# Patient Record
Sex: MALE | Race: WHITE | HISPANIC OR LATINO | Employment: FULL TIME | URBAN - METROPOLITAN AREA
[De-identification: names, ages, dates, MRNs, and addresses within clinical notes are randomized per-mention and may not be internally consistent; named-entity substitution may affect disease eponyms.]

---

## 2022-09-21 ENCOUNTER — TELEPHONE (OUTPATIENT)
Dept: SCHEDULING | Facility: IMAGING CENTER | Age: 62
End: 2022-09-21

## 2022-09-22 ENCOUNTER — OFFICE VISIT (OUTPATIENT)
Dept: MEDICAL GROUP | Facility: PHYSICIAN GROUP | Age: 62
End: 2022-09-22
Payer: COMMERCIAL

## 2022-09-22 ENCOUNTER — HOSPITAL ENCOUNTER (OUTPATIENT)
Dept: LAB | Facility: MEDICAL CENTER | Age: 62
End: 2022-09-22
Attending: NURSE PRACTITIONER
Payer: COMMERCIAL

## 2022-09-22 VITALS
HEIGHT: 72 IN | WEIGHT: 315 LBS | DIASTOLIC BLOOD PRESSURE: 76 MMHG | OXYGEN SATURATION: 97 % | RESPIRATION RATE: 18 BRPM | HEART RATE: 86 BPM | BODY MASS INDEX: 42.66 KG/M2 | SYSTOLIC BLOOD PRESSURE: 128 MMHG | TEMPERATURE: 97.1 F

## 2022-09-22 DIAGNOSIS — M25.562 CHRONIC PAIN OF BOTH KNEES: ICD-10-CM

## 2022-09-22 DIAGNOSIS — Z13.6 ENCOUNTER FOR LIPID SCREENING FOR CARDIOVASCULAR DISEASE: ICD-10-CM

## 2022-09-22 DIAGNOSIS — M25.561 CHRONIC PAIN OF BOTH KNEES: ICD-10-CM

## 2022-09-22 DIAGNOSIS — Z13.220 ENCOUNTER FOR LIPID SCREENING FOR CARDIOVASCULAR DISEASE: ICD-10-CM

## 2022-09-22 DIAGNOSIS — Z13.1 ENCOUNTER FOR SCREENING FOR DIABETES MELLITUS: ICD-10-CM

## 2022-09-22 DIAGNOSIS — E66.01 CLASS 3 SEVERE OBESITY DUE TO EXCESS CALORIES WITHOUT SERIOUS COMORBIDITY WITH BODY MASS INDEX (BMI) OF 50.0 TO 59.9 IN ADULT (HCC): ICD-10-CM

## 2022-09-22 DIAGNOSIS — Z13.0 SCREENING FOR DEFICIENCY ANEMIA: ICD-10-CM

## 2022-09-22 DIAGNOSIS — Z23 NEED FOR VACCINATION: ICD-10-CM

## 2022-09-22 DIAGNOSIS — G89.29 CHRONIC PAIN OF BOTH KNEES: ICD-10-CM

## 2022-09-22 PROBLEM — E66.813 CLASS 3 SEVERE OBESITY DUE TO EXCESS CALORIES WITHOUT SERIOUS COMORBIDITY WITH BODY MASS INDEX (BMI) OF 50.0 TO 59.9 IN ADULT (HCC): Status: ACTIVE | Noted: 2022-09-22

## 2022-09-22 LAB
ALBUMIN SERPL BCP-MCNC: 3.8 G/DL (ref 3.2–4.9)
ALBUMIN/GLOB SERPL: 1.2 G/DL
ALP SERPL-CCNC: 105 U/L (ref 30–99)
ALT SERPL-CCNC: 27 U/L (ref 2–50)
ANION GAP SERPL CALC-SCNC: 9 MMOL/L (ref 7–16)
AST SERPL-CCNC: 13 U/L (ref 12–45)
BILIRUB SERPL-MCNC: 0.5 MG/DL (ref 0.1–1.5)
BUN SERPL-MCNC: 19 MG/DL (ref 8–22)
CALCIUM SERPL-MCNC: 9 MG/DL (ref 8.5–10.5)
CHLORIDE SERPL-SCNC: 107 MMOL/L (ref 96–112)
CHOLEST SERPL-MCNC: 143 MG/DL (ref 100–199)
CO2 SERPL-SCNC: 25 MMOL/L (ref 20–33)
CREAT SERPL-MCNC: 0.87 MG/DL (ref 0.5–1.4)
ERYTHROCYTE [DISTWIDTH] IN BLOOD BY AUTOMATED COUNT: 46.1 FL (ref 35.9–50)
FASTING STATUS PATIENT QL REPORTED: NORMAL
GFR SERPLBLD CREATININE-BSD FMLA CKD-EPI: 97 ML/MIN/1.73 M 2
GLOBULIN SER CALC-MCNC: 3.1 G/DL (ref 1.9–3.5)
GLUCOSE SERPL-MCNC: 109 MG/DL (ref 65–99)
HCT VFR BLD AUTO: 51.9 % (ref 42–52)
HDLC SERPL-MCNC: 46 MG/DL
HGB BLD-MCNC: 17.3 G/DL (ref 14–18)
LDLC SERPL CALC-MCNC: 83 MG/DL
MCH RBC QN AUTO: 31.4 PG (ref 27–33)
MCHC RBC AUTO-ENTMCNC: 33.3 G/DL (ref 33.7–35.3)
MCV RBC AUTO: 94.2 FL (ref 81.4–97.8)
PLATELET # BLD AUTO: 297 K/UL (ref 164–446)
PMV BLD AUTO: 10 FL (ref 9–12.9)
POTASSIUM SERPL-SCNC: 4.8 MMOL/L (ref 3.6–5.5)
PROT SERPL-MCNC: 6.9 G/DL (ref 6–8.2)
RBC # BLD AUTO: 5.51 M/UL (ref 4.7–6.1)
SODIUM SERPL-SCNC: 141 MMOL/L (ref 135–145)
TRIGL SERPL-MCNC: 69 MG/DL (ref 0–149)
WBC # BLD AUTO: 8.4 K/UL (ref 4.8–10.8)

## 2022-09-22 PROCEDURE — 85027 COMPLETE CBC AUTOMATED: CPT

## 2022-09-22 PROCEDURE — 99204 OFFICE O/P NEW MOD 45 MIN: CPT | Mod: 25 | Performed by: NURSE PRACTITIONER

## 2022-09-22 PROCEDURE — 36415 COLL VENOUS BLD VENIPUNCTURE: CPT

## 2022-09-22 PROCEDURE — 80061 LIPID PANEL: CPT

## 2022-09-22 PROCEDURE — 90686 IIV4 VACC NO PRSV 0.5 ML IM: CPT | Performed by: NURSE PRACTITIONER

## 2022-09-22 PROCEDURE — 90471 IMMUNIZATION ADMIN: CPT | Performed by: NURSE PRACTITIONER

## 2022-09-22 PROCEDURE — 80053 COMPREHEN METABOLIC PANEL: CPT

## 2022-09-22 RX ORDER — ESCITALOPRAM OXALATE 20 MG/1
20 TABLET ORAL DAILY
COMMUNITY
Start: 2022-09-07 | End: 2022-09-22

## 2022-09-22 RX ORDER — ALBUTEROL SULFATE 90 UG/1
AEROSOL, METERED RESPIRATORY (INHALATION)
COMMUNITY
Start: 2022-07-27

## 2022-09-22 RX ORDER — MELOXICAM 7.5 MG/1
7.5 TABLET ORAL DAILY
Qty: 30 TABLET | Refills: 1 | Status: SHIPPED | OUTPATIENT
Start: 2022-09-22

## 2022-09-22 ASSESSMENT — PATIENT HEALTH QUESTIONNAIRE - PHQ9: CLINICAL INTERPRETATION OF PHQ2 SCORE: 0

## 2022-09-22 NOTE — ASSESSMENT & PLAN NOTE
Chronic and ongoing. He states that he knows he needs to work on his diet and exercise. He states that ever since he got COVID-19 he has had a difficult time getting back into feeling himself.

## 2022-09-22 NOTE — PROGRESS NOTES
Subjective  Chief Complaint  Establish care to manage his chronic conditions    History of Present Illness  Shyam Escamilla is a 62 y.o. male. This patient is here today to establish care.    Chronic pain of both knees  Chronic and worsening. He states that both of his knees have pain but over the past couple of weeks the pain has gotten worse. He states that the pain start on the inner part of his knee and spreads out to the outside of his knees. He states in the past it would hurt in the winter and he accounted it from him being active playing sports when he was younger. He states that now the pain is getting to the point that he is scared to go down stairs and he will take one stair at a time. He states that he has never got any imaging of his knees. He states that he does not take anything for the pain.    Class 3 severe obesity due to excess calories without serious comorbidity with body mass index (BMI) of 50.0 to 59.9 in adult (HCC)  Chronic and ongoing. He states that he knows he needs to work on his diet and exercise. He states that ever since he got COVID-19 he has had a difficult time getting back into feeling himself.    Past Medical History    Allergies: Patient has no known allergies.  History reviewed. No pertinent past medical history.  History reviewed. No pertinent surgical history.  Current Outpatient Medications Ordered in Epic   Medication Sig Dispense Refill    albuterol 108 (90 Base) MCG/ACT Aero Soln inhalation aerosol INHALE ONE TO TWO PUFFS BY MOUTH EVERY 4-6 HOURS AS NEEDED      meloxicam (MOBIC) 7.5 MG Tab Take 1 Tablet by mouth every day. 30 Tablet 1     No current Epic-ordered facility-administered medications on file.     Family History:    History reviewed. No pertinent family history.   Personal/Social History:    Social History     Tobacco Use    Smoking status: Never    Smokeless tobacco: Never   Vaping Use    Vaping Use: Never used   Substance Use Topics    Alcohol use: Not Currently     Drug use: Not Currently     Social History     Social History Narrative    Not on file      Review of Systems:     General: Negative for fever/chills and unexpected weight change.    Eyes:  Negative for vision changes, eye pain.   Respiratory:  Negative for cough and dyspnea.     Cardiovascular:  Negative for chest pain and palpitations.   Musculoskeletal: Positive for knee pain.   Skin:  Negative for rash.    Neurological:  Negative for numbness/tingling and headaches.     Objective  Physical Exam:   /76 (BP Location: Left arm, Patient Position: Sitting, BP Cuff Size: Large adult)   Pulse 86   Temp 36.2 °C (97.1 °F) (Temporal)   Resp 18   Ht 1.829 m (6')   Wt (!) 170 kg (374 lb 4 oz)   SpO2 97%  Body mass index is 50.76 kg/m².  General:  Alert and oriented.  Well appearing.  NAD.  Head:  Normocephalic.   Neck: Supple without JVD. No lymphadenopathy.  Pulmonary:  Normal effort.  Clear to ausculation without rales, ronchi, or wheezing.  Cardiovascular:  Regular rate and rhythm without murmur, rubs or gallop.  Radial pulses are intact and equal bilaterally.  Musculoskeletal:  No extremity cyanosis, clubbing, or edema.  Skin:  Warm and dry.  No obvious lesions.  Psych: Normal mood and affect. Alert and oriented x3. Judgment and insight is normal.      Assessment/Plan   1. Chronic pain of both knees  Chronic and worsening.  Discussed getting an x-ray of his knees, he is agreeable.  Discussed Meloxicam risks, benefits and side effects, he verbalized understanding.  Discussed depending on his x-ray results that I may refer him to Orthopedics, he is agreeable.  - DX-KNEE COMPLETE 4+ LEFT; Future  - DX-KNEE COMPLETE 4+ RIGHT; Future  - meloxicam (MOBIC) 7.5 MG Tab; Take 1 Tablet by mouth every day.  Dispense: 30 Tablet; Refill: 1    2. Class 3 severe obesity due to excess calories without serious comorbidity with body mass index (BMI) of 50.0 to 59.9 in adult (HCC)  Chronic and ongoing.  Educated on a healthy  diet and exercise.    3. Encounter for lipid screening for cardiovascular disease  Due for updated labs.  - Lipid Profile; Future    4. Encounter for screening for diabetes mellitus  Due for updated labs.  - Comp Metabolic Panel; Future    5. Screening for deficiency anemia  Due for updated labs.  - CBC WITHOUT DIFFERENTIAL; Future    6. Need for vaccination  - INFLUENZA VACCINE QUAD INJ (PF)      Health Maintenance: Records Requested    Return in about 1 month (around 10/22/2022) for Medication F/U.    I have placed the above orders and discussed them with an approved delegating provider.  The MA is performing the below orders under the direction of Dr. Osvaldo Mahmood MD/DO.     Please note that this dictation was created using voice recognition software. I have made every reasonable attempt to correct obvious errors, but I expect that there are errors of grammar and possibly content that I did not discover before finalizing the note.    CATARINO Garzon  [facility]

## 2022-09-22 NOTE — ASSESSMENT & PLAN NOTE
Chronic and worsening. He states that both of his knees have pain but over the past couple of weeks the pain has gotten worse. He states that the pain start on the inner part of his knee and spreads out to the outside of his knees. He states in the past it would hurt in the winter and he accounted it from him being active playing sports when he was younger. He states that now the pain is getting to the point that he is scared to go down stairs and he will take one stair at a time. He states that he has never got any imaging of his knees. He states that he does not take anything for the pain.

## 2022-09-26 ENCOUNTER — APPOINTMENT (OUTPATIENT)
Dept: RADIOLOGY | Facility: MEDICAL CENTER | Age: 62
End: 2022-09-26
Attending: NURSE PRACTITIONER
Payer: COMMERCIAL

## 2022-09-26 DIAGNOSIS — G89.29 CHRONIC PAIN OF BOTH KNEES: ICD-10-CM

## 2022-09-26 DIAGNOSIS — M25.561 CHRONIC PAIN OF BOTH KNEES: ICD-10-CM

## 2022-09-26 DIAGNOSIS — M25.562 CHRONIC PAIN OF BOTH KNEES: ICD-10-CM

## 2022-09-26 PROCEDURE — 73564 X-RAY EXAM KNEE 4 OR MORE: CPT | Mod: LT

## 2022-09-26 PROCEDURE — 73564 X-RAY EXAM KNEE 4 OR MORE: CPT | Mod: RT

## 2023-06-05 ENCOUNTER — HOSPITAL ENCOUNTER (OUTPATIENT)
Dept: RADIOLOGY | Facility: MEDICAL CENTER | Age: 63
End: 2023-06-05
Attending: PHYSICIAN ASSISTANT
Payer: COMMERCIAL

## 2023-06-05 DIAGNOSIS — C61 MALIGNANT NEOPLASM OF PROSTATE (HCC): ICD-10-CM

## 2023-06-05 PROCEDURE — 77080 DXA BONE DENSITY AXIAL: CPT

## 2024-04-18 ENCOUNTER — OFFICE VISIT (OUTPATIENT)
Dept: MEDICAL GROUP | Facility: IMAGING CENTER | Age: 64
End: 2024-04-18
Payer: COMMERCIAL

## 2024-04-18 VITALS
WEIGHT: 315 LBS | SYSTOLIC BLOOD PRESSURE: 126 MMHG | BODY MASS INDEX: 42.66 KG/M2 | TEMPERATURE: 97.8 F | OXYGEN SATURATION: 91 % | HEART RATE: 70 BPM | RESPIRATION RATE: 16 BRPM | HEIGHT: 72 IN | DIASTOLIC BLOOD PRESSURE: 70 MMHG

## 2024-04-18 DIAGNOSIS — Z11.59 NEED FOR HEPATITIS C SCREENING TEST: ICD-10-CM

## 2024-04-18 DIAGNOSIS — G47.30 SLEEP APNEA, UNSPECIFIED TYPE: ICD-10-CM

## 2024-04-18 DIAGNOSIS — Z85.46 HISTORY OF PROSTATE CANCER: ICD-10-CM

## 2024-04-18 DIAGNOSIS — Z11.4 SCREENING FOR HIV (HUMAN IMMUNODEFICIENCY VIRUS): ICD-10-CM

## 2024-04-18 DIAGNOSIS — G89.29 CHRONIC PAIN OF BOTH KNEES: ICD-10-CM

## 2024-04-18 DIAGNOSIS — J06.9 UPPER RESPIRATORY TRACT INFECTION, UNSPECIFIED TYPE: ICD-10-CM

## 2024-04-18 DIAGNOSIS — M25.561 CHRONIC PAIN OF BOTH KNEES: ICD-10-CM

## 2024-04-18 DIAGNOSIS — L91.8 ACROCHORDON: ICD-10-CM

## 2024-04-18 DIAGNOSIS — Z13.21 ENCOUNTER FOR VITAMIN DEFICIENCY SCREENING: ICD-10-CM

## 2024-04-18 DIAGNOSIS — E66.01 CLASS 3 SEVERE OBESITY DUE TO EXCESS CALORIES WITHOUT SERIOUS COMORBIDITY WITH BODY MASS INDEX (BMI) OF 50.0 TO 59.9 IN ADULT (HCC): ICD-10-CM

## 2024-04-18 DIAGNOSIS — R06.00 DYSPNEA, UNSPECIFIED TYPE: ICD-10-CM

## 2024-04-18 DIAGNOSIS — R09.02 HYPOXIA: ICD-10-CM

## 2024-04-18 DIAGNOSIS — M25.562 CHRONIC PAIN OF BOTH KNEES: ICD-10-CM

## 2024-04-18 LAB
FLUAV RNA SPEC QL NAA+PROBE: NEGATIVE
FLUBV RNA SPEC QL NAA+PROBE: NEGATIVE
RSV RNA SPEC QL NAA+PROBE: NEGATIVE
SARS-COV-2 RNA RESP QL NAA+PROBE: NEGATIVE

## 2024-04-18 PROCEDURE — 3078F DIAST BP <80 MM HG: CPT | Performed by: STUDENT IN AN ORGANIZED HEALTH CARE EDUCATION/TRAINING PROGRAM

## 2024-04-18 PROCEDURE — 0241U POCT CEPHEID COV-2, FLU A/B, RSV - PCR: CPT | Performed by: STUDENT IN AN ORGANIZED HEALTH CARE EDUCATION/TRAINING PROGRAM

## 2024-04-18 PROCEDURE — 99214 OFFICE O/P EST MOD 30 MIN: CPT | Performed by: STUDENT IN AN ORGANIZED HEALTH CARE EDUCATION/TRAINING PROGRAM

## 2024-04-18 PROCEDURE — 3074F SYST BP LT 130 MM HG: CPT | Performed by: STUDENT IN AN ORGANIZED HEALTH CARE EDUCATION/TRAINING PROGRAM

## 2024-04-18 RX ORDER — ALBUTEROL SULFATE 90 UG/1
1 AEROSOL, METERED RESPIRATORY (INHALATION) EVERY 4 HOURS PRN
Qty: 8.5 G | Refills: 3 | Status: SHIPPED | OUTPATIENT
Start: 2024-04-18

## 2024-04-18 ASSESSMENT — ANXIETY QUESTIONNAIRES
GAD7 TOTAL SCORE: 7
5. BEING SO RESTLESS THAT IT IS HARD TO SIT STILL: NOT AT ALL
4. TROUBLE RELAXING: NOT AT ALL
3. WORRYING TOO MUCH ABOUT DIFFERENT THINGS: NOT AT ALL
7. FEELING AFRAID AS IF SOMETHING AWFUL MIGHT HAPPEN: MORE THAN HALF THE DAYS
6. BECOMING EASILY ANNOYED OR IRRITABLE: MORE THAN HALF THE DAYS
2. NOT BEING ABLE TO STOP OR CONTROL WORRYING: MORE THAN HALF THE DAYS
1. FEELING NERVOUS, ANXIOUS, OR ON EDGE: SEVERAL DAYS

## 2024-04-18 ASSESSMENT — PATIENT HEALTH QUESTIONNAIRE - PHQ9: CLINICAL INTERPRETATION OF PHQ2 SCORE: 0

## 2024-04-18 ASSESSMENT — FIBROSIS 4 INDEX: FIB4 SCORE: 0.54

## 2024-04-18 NOTE — PATIENT INSTRUCTIONS
Thank you for choosing [facility]. It was a pleasure meeting you today.     Take care!  [facility]shmi [facility]ro[facility]

## 2024-04-18 NOTE — PROGRESS NOTES
Subjective:       CC:   Chief Complaint   Patient presents with    Congestion     Started 1 week ago    Shortness of Breath     After walking down stairs it was 89%    Establish Care    Cough    Medication Refill     Albuterol       HPI:   Shyam is a 64 y.o. male is new to me and is here today to establish care. Previous PCP was Gemma TORIBIO. Specialists:[facility]. Pt would like to discuss the following today:    URI:reports having nasal congestion and cough.  Symptoms started a week ago.  They have not worsened. Per wife, breathing has been worse. Has had pneumonia in the past. Cough has a yellow phlegm. Had wheezing at the beginning but has resolved. Denies body aches, fever, CP, diarrhea, vomiting, and loss of smell or taste. Has tried OTC Robitussin and it helped a little.       CARL: states he was diagnosed with CARL in 2010. A few months ago he was hospitalized with COVID-19 and was told his lungs were working at 80%. States he is out of breath frequently. He needs to sit down to catch his breath. His oxygen level drops 80s with activity.     History of prostate cancer: per pt he was diagnosed last year. S/P radiation. He's established with [facility]. Has upcoming appt next week.     Chronic pain in both knees: chronic. Per pt his pain is well controlled now. He takes Meloxicam 7.5mg daily.     Skin lesion: located next to L ear. Per wife it is growing and changing color. It is itchy and unconfortable. Denies family history of skin cancer.       Health Maintenance/Immunizations: Per pt he completed colonoscopy at [facility] or [facility]. Will obtain records. Per pt he is UTD with Zoster and hep B immunizations. Recommend Tdap.     ROS:   See HPI    Patient Active Problem List    Diagnosis Date Noted    Dyspnea 04/18/2024    History of prostate cancer 04/18/2024    Hypoxia 04/18/2024    Chronic pain of both knees 09/22/2022    Class 3 severe obesity due to excess calories without serious comorbidity with body  mass index (BMI) of 50.0 to 59.9 in adult ([facility]) 09/22/2022    Sleep apnea 02/11/2016       Past Medical History:   Diagnosis Date    Prostate cancer ([facility])        Current Outpatient Medications   Medication Sig Dispense Refill    albuterol 108 (90 Base) MCG/ACT Aero Soln inhalation aerosol Inhale 1 Puff every four hours as needed for Shortness of Breath. 8.5 g 3    meloxicam (MOBIC) 7.5 MG Tab Take 1 Tablet by mouth every day. 30 Tablet 1     No current facility-administered medications for this visit.       Allergies as of 04/18/2024    (No Known Allergies)       Social History     Socioeconomic History    Marital status:      Spouse name: Not on file    Number of children: Not on file    Years of education: Not on file    Highest education level: Not on file   Occupational History    Not on file   Tobacco Use    Smoking status: Never    Smokeless tobacco: Never   Vaping Use    Vaping Use: Never used   Substance and Sexual Activity    Alcohol use: Not Currently    Drug use: Not Currently    Sexual activity: Yes     Partners: Female     Birth control/protection: None   Other Topics Concern    Not on file   Social History Narrative    Not on file     Social Determinants of Health     Financial Resource Strain: Not on file   Food Insecurity: Not on file   Transportation Needs: Not on file   Physical Activity: Not on file   Stress: Not on file   Social Connections: Not on file   Intimate Partner Violence: Not on file   Housing Stability: Not on file       Family History   Problem Relation Age of Onset    Cancer Mother         Breast    Glaucoma Father     Cancer Brother         Prostate       History reviewed. No pertinent surgical history.        Objective:     /70 (BP Location: Right arm, Patient Position: Sitting, BP Cuff Size: Adult)   Pulse 70   Temp 36.6 °C (97.8 °F) (Temporal)   Resp 16   Ht 1.829 m (6')   Wt (!) 177 kg (390 lb)   SpO2 94%   BMI 52.89 kg/m²     Physical Exam  Vitals reviewed.    Constitutional:       General: He is not in acute distress.     Appearance: Normal appearance. He is not ill-appearing or toxic-appearing.   HENT:      Head: Normocephalic and atraumatic.      Right Ear: Tympanic membrane, ear canal and external ear normal.      Left Ear: Tympanic membrane, ear canal and external ear normal.      Nose: Congestion and rhinorrhea present.      Mouth/Throat:      Mouth: Mucous membranes are moist.      Pharynx: Oropharynx is clear. No oropharyngeal exudate or posterior oropharyngeal erythema.      Tonsils: No tonsillar exudate.   Eyes:      General: No scleral icterus.        Right eye: No discharge.         Left eye: No discharge.      Extraocular Movements: Extraocular movements intact.      Conjunctiva/sclera: Conjunctivae normal.      Pupils: Pupils are equal, round, and reactive to light.   Neck:      Thyroid: No thyroid mass or thyromegaly.   Cardiovascular:      Rate and Rhythm: Normal rate and regular rhythm.      Pulses: Normal pulses.      Heart sounds: Normal heart sounds. No murmur heard.  Pulmonary:      Effort: Pulmonary effort is normal. No respiratory distress.      Breath sounds: Normal breath sounds. No wheezing or rhonchi.      Comments: Diminished lung sounds  Abdominal:      General: Bowel sounds are normal. There is no distension.      Palpations: Abdomen is soft. There is no mass.      Tenderness: There is no abdominal tenderness.   Musculoskeletal:         General: Normal range of motion.      Cervical back: Normal range of motion and neck supple. No tenderness.   Lymphadenopathy:      Cervical: No cervical adenopathy.   Skin:     General: Skin is warm and dry.      Coloration: Skin is not jaundiced.      Comments: Skin tag present over L zygomatic arch   Neurological:      General: No focal deficit present.      Mental Status: He is alert and oriented to person, place, and time.      Gait: Gait normal.   Psychiatric:         Mood and Affect: Mood normal.          Behavior: Behavior normal.         Thought Content: Thought content normal.         Judgment: Judgment normal.          Assessment and Plan:     1. Dyspnea, unspecified type  2. Hypoxia  Acute.  Patient has been experiencing hypoxia and shortness of breath with activity.  O2 sats did drop to high 80s during walking test.  O2 sats were above 90% with oxygen 2 L via nasal canula.  Faxed order for oxygen 2L via nasal canula.  Sent prescription for albuterol inhaler to use as needed.  Referred to pulmonology.  Chest x-ray and PFTs ordered to evaluate his lung function.  Will follow-up after completing tests.  Strict ED precautions given.  - albuterol 108 (90 Base) MCG/ACT Aero Soln inhalation aerosol; Inhale 1 Puff every four hours as needed for Shortness of Breath.  Dispense: 8.5 g; Refill: 3  - Referral to Pulmonary and Sleep Medicine  - PULMONARY FUNCTION TESTS -Test requested: Complete Pulmonary Function Test; Future  - DX-CHEST-2 VIEWS; Future        3. Sleep apnea, unspecified type  Chronic and stable.  Patient uses CPAP.  Referred to sleep medicine for new sleep test.  Chest x-ray and PFTs ordered for evaluation.  - Referral to Pulmonary and Sleep Medicine  - PULMONARY FUNCTION TESTS -Test requested: Complete Pulmonary Function Test; Future  - DX-CHEST-2 VIEWS; Future    4. Class 3 severe obesity due to excess calories without serious comorbidity with body mass index (BMI) of 50.0 to 59.9 in adult (HCC)  Chronic.  Per patient he cannot exercise due to his breathing.  Recommend healthy diet.  He is interested in nutrition counseling, referral placed.  Screening labs as listed below ordered.  Discussed lifestyle modifications to help lose weight and help prevent disease such as diabetes and heart disease.  Improve your eating habits. Eat a variety of foods from each food group: include whole grains, vegetables, fruits, low fat or fat free dairy, and protein foods, mostly plant based. Limit foods high in fat,  sugar, calories, and sodium. Eat slowly, and don't do anything else, such as watch TV, while you are eating. Pay attention to portion sizes. Put your food on a smaller plate, follow MyPlate guidelines:vegetables make up the largest section, followed by grains. Together, fruits and vegetables fill half the plate, while proteins and grains fill the other half.  Regular activity can help you feel better, have more energy, and burn more calories. If you haven't been active, start slowly. Start with at least 30 minutes of moderate activity on most days of the week; then gradually increase the amount of activity. Try for 60 or 90 minutes a day, at least 5 days a week.   - HEMOGLOBIN A1C; Future  - TSH WITH REFLEX TO FT4; Future  - Comp Metabolic Panel; Future  - CBC WITH DIFFERENTIAL; Future  - Lipid Profile; Future  - Referral to Nutrition Services    5. Chronic pain of both knees  Chronic and well-controlled.  Will continue with meloxicam 7.5 mg as needed.    6. History of prostate cancer-s/p radiation in 2023  Established with [facility].  Will follow-up as planned.    7. Need for hepatitis C screening test  - HEP C VIRUS ANTIBODY; Future    8. Screening for HIV (human immunodeficiency virus)  - HIV AG/AB COMBO ASSAY SCREENING; Future    9. Encounter for vitamin deficiency screening  - VITAMIN D 25-HYDROXY    10. Acrochordon  Chronic.  Patient would like it removed.  Referred to dermatology.  - Referral to Dermatology    11. Upper respiratory tract infection, unspecified type  Acute.  Suspect viral etiology.  Recommend supportive care.  Point-of-care COVID-19, flu, and RSV negative.  Advised patient to return to clinic if symptoms persist beyond two weeks and to go to emergency room if symptoms worsen.  - POCT Cepheid CoV-2, Flu A/B, RSV - PCR    Latest Reference Range & Units 04/18/24 09:04   Influenza virus A RNA Negative, Invalid  Negative   Influenza virus B, PCR Negative, Invalid  Negative   RSV, PCR Negative,  Invalid  Negative   SARS-CoV-2 by PCR Negative, Invalid  Negative         Diagnosis and treatment plan explained to pt. Pt agreed with treatment plan and verbalized understanding.       Return in about 6 weeks (around 5/30/2024) for test results.     Please note that this dictation was created using voice recognition software. I have made every reasonable attempt to correct obvious errors, but I expect that there are errors of grammar and possibly content that I did not discover before finalizing the note.    Lakshmi Infante PA-C  [facility]

## 2024-04-24 DIAGNOSIS — R06.00 DYSPNEA, UNSPECIFIED TYPE: ICD-10-CM

## 2024-04-24 DIAGNOSIS — R09.02 HYPOXIA: ICD-10-CM

## 2024-05-16 ENCOUNTER — APPOINTMENT (RX ONLY)
Dept: URBAN - METROPOLITAN AREA CLINIC 4 | Facility: CLINIC | Age: 64
Setting detail: DERMATOLOGY
End: 2024-05-16

## 2024-05-16 DIAGNOSIS — D22 MELANOCYTIC NEVI: ICD-10-CM

## 2024-05-16 DIAGNOSIS — Z71.89 OTHER SPECIFIED COUNSELING: ICD-10-CM

## 2024-05-16 PROBLEM — D48.5 NEOPLASM OF UNCERTAIN BEHAVIOR OF SKIN: Status: ACTIVE | Noted: 2024-05-16

## 2024-05-16 PROCEDURE — ? PHOTO-DOCUMENTATION

## 2024-05-16 PROCEDURE — ? COUNSELING

## 2024-05-16 PROCEDURE — ? DIAGNOSIS COMMENT

## 2024-05-16 PROCEDURE — ? DEFER

## 2024-05-16 PROCEDURE — 99203 OFFICE O/P NEW LOW 30 MIN: CPT

## 2024-05-16 ASSESSMENT — LOCATION ZONE DERM
LOCATION ZONE: SCALP
LOCATION ZONE: FACE

## 2024-05-16 ASSESSMENT — LOCATION SIMPLE DESCRIPTION DERM
LOCATION SIMPLE: LEFT FOREHEAD
LOCATION SIMPLE: SCALP

## 2024-05-16 ASSESSMENT — LOCATION DETAILED DESCRIPTION DERM
LOCATION DETAILED: LEFT SUPERIOR FOREHEAD
LOCATION DETAILED: RIGHT CENTRAL FRONTAL SCALP

## 2024-05-16 NOTE — PROCEDURE: DIAGNOSIS COMMENT
Detail Level: Simple
Comment: Patient has two lesions I recommend biopsy when he returns from his trip in two weeks.  He understands my concern and risk.
Render Risk Assessment In Note?: no

## 2024-05-28 ENCOUNTER — TELEPHONE (OUTPATIENT)
Dept: HEALTH INFORMATION MANAGEMENT | Facility: OTHER | Age: 64
End: 2024-05-28
Payer: COMMERCIAL

## 2024-05-29 ENCOUNTER — OFFICE VISIT (OUTPATIENT)
Dept: MEDICAL GROUP | Facility: IMAGING CENTER | Age: 64
End: 2024-05-29
Payer: COMMERCIAL

## 2024-05-29 VITALS
HEIGHT: 72 IN | TEMPERATURE: 97.1 F | WEIGHT: 315 LBS | SYSTOLIC BLOOD PRESSURE: 126 MMHG | DIASTOLIC BLOOD PRESSURE: 72 MMHG | OXYGEN SATURATION: 95 % | BODY MASS INDEX: 42.66 KG/M2 | RESPIRATION RATE: 16 BRPM | HEART RATE: 74 BPM

## 2024-05-29 DIAGNOSIS — Z23 ENCOUNTER FOR ADMINISTRATION OF VACCINE: ICD-10-CM

## 2024-05-29 DIAGNOSIS — M54.50 ACUTE RIGHT-SIDED LOW BACK PAIN WITHOUT SCIATICA: ICD-10-CM

## 2024-05-29 PROBLEM — R09.02 HYPOXIA: Status: RESOLVED | Noted: 2024-04-18 | Resolved: 2024-05-29

## 2024-05-29 PROBLEM — R06.00 DYSPNEA: Status: RESOLVED | Noted: 2024-04-18 | Resolved: 2024-05-29

## 2024-05-29 LAB
APPEARANCE UR: CLEAR
BILIRUB UR STRIP-MCNC: NEGATIVE MG/DL
COLOR UR AUTO: YELLOW
GLUCOSE UR STRIP.AUTO-MCNC: NEGATIVE MG/DL
KETONES UR STRIP.AUTO-MCNC: NEGATIVE MG/DL
LEUKOCYTE ESTERASE UR QL STRIP.AUTO: NEGATIVE
NITRITE UR QL STRIP.AUTO: NEGATIVE
PH UR STRIP.AUTO: 5.5 [PH] (ref 5–8)
PROT UR QL STRIP: NEGATIVE MG/DL
RBC UR QL AUTO: NEGATIVE
SP GR UR STRIP.AUTO: <=1.005
UROBILINOGEN UR STRIP-MCNC: 0.2 MG/DL

## 2024-05-29 RX ORDER — MELOXICAM 7.5 MG/1
7.5 TABLET ORAL
Qty: 30 TABLET | Refills: 0 | Status: SHIPPED | OUTPATIENT
Start: 2024-05-29

## 2024-05-29 ASSESSMENT — FIBROSIS 4 INDEX: FIB4 SCORE: 0.54

## 2024-05-29 NOTE — PROGRESS NOTES
Subjective:     CC:   Chief Complaint   Patient presents with    Back Pain     Lower right back, feels like its stabbing, does not radiate to flanks, upper back or hips. Hurts when bending over and standing too long       HPI:     Verbal consent was acquired by the patient to use [employer] ambient listening note generation during this visit Yes      reno Freeman, 64 y.o., male,  presents today to discuss:     History of Present Illness    Acute back pain:  The patient reports experiencing right-sided lower back pain, which initiated approximately 2 weeks ago. Despite the application of ice, the pain persists. The pain intensifies during physical activities such as bending, moving, standing, and ambulation, often leading to a sensation of instability. The pain does not radiate to the legs. The patient denies any history of back injuries or falls, and reports a pain level of 10 on a scale of 1 to 10. The pain is described as a sharp, stabbing sensation. The patient denies any urinary symptoms such as hematuria, urinary frequency, or urgency. However, the patient reports increased urination, which he attributes to his increase of water intake, 120 ounces of water daily. The patient has lost approximately 30 pounds, attributing this to cutting his sugar intake and doing lifestyle modifications. The patient denies any fever, chills, nausea, or vomiting. The patient has run out of meloxicam. Does not take anything for pain.    Since he has been losing weight, he has not experienced respiratory problems.  O2 sats are well-maintained above 90%.  He has not completed labs but will schedule appointment with [facility].       ROS:  See HPI    Medications, allergies, past medical history, family history, surgical history, and social history documented in chart and reviewed by me.       Objective:   Exam:  /72 (BP Location: Left arm, Patient Position: Sitting, BP Cuff Size: Adult)   Pulse 74   Temp 36.2  °C (97.1 °F) (Temporal)   Resp 16   Ht 1.829 m (6')   Wt (!) 165 kg (363 lb 9.6 oz)   SpO2 95%   BMI 49.31 kg/m²      Physical Exam  Vitals reviewed.   Constitutional:       General: He is not in acute distress.     Appearance: Normal appearance. He is not ill-appearing.   HENT:      Head: Normocephalic and atraumatic.   Eyes:      General: No scleral icterus.  Cardiovascular:      Rate and Rhythm: Normal rate and regular rhythm.      Pulses: Normal pulses.      Heart sounds: Normal heart sounds. No murmur heard.  Pulmonary:      Effort: Pulmonary effort is normal. No respiratory distress.      Breath sounds: Normal breath sounds. No wheezing.   Abdominal:      General: Abdomen is flat. Bowel sounds are normal. There is no distension.      Palpations: Abdomen is soft. There is no mass.      Tenderness: There is no abdominal tenderness.   Musculoskeletal:         General: No swelling, tenderness or deformity.      Cervical back: Neck supple. No swelling, edema or deformity.      Thoracic back: No swelling, edema, deformity or signs of trauma.      Lumbar back: Bony tenderness present. No swelling, edema, deformity or spasms. Normal range of motion.      Comments: Right flank pain tender to palpation   Skin:     General: Skin is warm and dry.      Coloration: Skin is not jaundiced.      Findings: No bruising.   Neurological:      General: No focal deficit present.      Mental Status: He is alert.      Motor: No weakness.      Gait: Gait normal.   Psychiatric:         Mood and Affect: Mood normal.         Behavior: Behavior normal.         Thought Content: Thought content normal.         Judgment: Judgment normal.              Assessment & Plan:       Assessment & Plan  1. Acute right-sided low back pain without sciatica  Acute. Unknown etiology.  Differential diagnosis include but not limited to muscle strain, cystitis, nephrolithiasis, and pyelonephritis.  Patient does not have symptoms indicating pyelonephritis  or cystitis.  Point-of-care UA is normal.  Stat CT of abdomen and pelvis ordered to rule out nephrolithiasis.  Refill for meloxicam 7.5 mg to take for pain relief.  Advised patient to take medication with food.  Will follow-up with patient after completing CT scan.  Strict ED precautions given.  - POCT Urinalysis  - CT-ABDOMEN-PELVIS W/O; Future  - meloxicam (MOBIC) 7.5 MG Tab; Take 1 Tablet by mouth 1 time a day as needed for Mild Pain (as needed for pain).  Dispense: 30 Tablet; Refill: 0   Lab Results   Component Value Date/Time    POCCOLOR Yellow 05/29/2024 09:29 AM    POCAPPEAR Clear 05/29/2024 09:29 AM    POCLEUKEST Negative 05/29/2024 09:29 AM    POCNITRITE Negative 05/29/2024 09:29 AM    POCUROBILIGE 0.2 05/29/2024 09:29 AM    POCPROTEIN Negative 05/29/2024 09:29 AM    POCURPH 5.5 05/29/2024 09:29 AM    POCBLOOD Negative 05/29/2024 09:29 AM    POCSPGRV <=1.005 05/29/2024 09:29 AM    POCKETONES Negative 05/29/2024 09:29 AM    POCBILIRUBIN Negative 05/29/2024 09:29 AM    POCGLUCUA Negative 05/29/2024 09:29 AM         2.  Encounter for administration of vaccine  Patient received Tdap today.  Tolerated well.             Diagnosis and treatment plan explained to pt. Counseled pt on new medication(s) and potential side effects. Pt agreed with treatment plan and verbalized understanding.     Return for schedule follow up after completing tests.     Please note that this dictation was created using voice recognition software. I have made every reasonable attempt to correct obvious errors, but I expect that there are errors of grammar and possibly content that I did not discover before finalizing the note.    Lakshmi Infante PA-C  [facility]

## 2024-06-03 ENCOUNTER — APPOINTMENT (RX ONLY)
Dept: URBAN - METROPOLITAN AREA CLINIC 4 | Facility: CLINIC | Age: 64
Setting detail: DERMATOLOGY
End: 2024-06-03

## 2024-06-03 DIAGNOSIS — Z71.89 OTHER SPECIFIED COUNSELING: ICD-10-CM

## 2024-06-03 DIAGNOSIS — D22 MELANOCYTIC NEVI: ICD-10-CM

## 2024-06-03 PROBLEM — D48.5 NEOPLASM OF UNCERTAIN BEHAVIOR OF SKIN: Status: ACTIVE | Noted: 2024-06-03

## 2024-06-03 PROCEDURE — 11102 TANGNTL BX SKIN SINGLE LES: CPT

## 2024-06-03 PROCEDURE — 11103 TANGNTL BX SKIN EA SEP/ADDL: CPT

## 2024-06-03 PROCEDURE — 99212 OFFICE O/P EST SF 10 MIN: CPT | Mod: 25

## 2024-06-03 PROCEDURE — ? BIOPSY BY SHAVE METHOD

## 2024-06-03 PROCEDURE — ? COUNSELING

## 2024-06-03 PROCEDURE — ? DIAGNOSIS COMMENT

## 2024-06-03 ASSESSMENT — LOCATION ZONE DERM
LOCATION ZONE: FACE
LOCATION ZONE: SCALP

## 2024-06-03 ASSESSMENT — LOCATION SIMPLE DESCRIPTION DERM
LOCATION SIMPLE: LEFT FOREHEAD
LOCATION SIMPLE: SCALP

## 2024-06-03 ASSESSMENT — LOCATION DETAILED DESCRIPTION DERM
LOCATION DETAILED: LEFT SUPERIOR FOREHEAD
LOCATION DETAILED: RIGHT CENTRAL FRONTAL SCALP

## 2024-10-22 ENCOUNTER — PATIENT MESSAGE (OUTPATIENT)
Dept: URGENT CARE | Facility: PHYSICIAN GROUP | Age: 64
End: 2024-10-22

## 2024-10-22 ENCOUNTER — OFFICE VISIT (OUTPATIENT)
Dept: URGENT CARE | Facility: PHYSICIAN GROUP | Age: 64
End: 2024-10-22
Payer: COMMERCIAL

## 2024-10-22 VITALS
DIASTOLIC BLOOD PRESSURE: 70 MMHG | HEART RATE: 93 BPM | HEIGHT: 72 IN | BODY MASS INDEX: 42.66 KG/M2 | SYSTOLIC BLOOD PRESSURE: 108 MMHG | WEIGHT: 315 LBS | RESPIRATION RATE: 16 BRPM | TEMPERATURE: 98.6 F | OXYGEN SATURATION: 92 %

## 2024-10-22 DIAGNOSIS — U07.1 COVID-19: ICD-10-CM

## 2024-10-22 DIAGNOSIS — R05.1 ACUTE COUGH: ICD-10-CM

## 2024-10-22 DIAGNOSIS — J02.9 SORE THROAT: ICD-10-CM

## 2024-10-22 LAB
FLUAV RNA SPEC QL NAA+PROBE: NEGATIVE
FLUBV RNA SPEC QL NAA+PROBE: NEGATIVE
RSV RNA SPEC QL NAA+PROBE: NEGATIVE
S PYO DNA SPEC NAA+PROBE: NOT DETECTED
SARS-COV-2 RNA RESP QL NAA+PROBE: POSITIVE

## 2024-10-22 PROCEDURE — 3078F DIAST BP <80 MM HG: CPT | Performed by: PHYSICIAN ASSISTANT

## 2024-10-22 PROCEDURE — 99214 OFFICE O/P EST MOD 30 MIN: CPT | Performed by: PHYSICIAN ASSISTANT

## 2024-10-22 PROCEDURE — 3074F SYST BP LT 130 MM HG: CPT | Performed by: PHYSICIAN ASSISTANT

## 2024-10-22 PROCEDURE — 87651 STREP A DNA AMP PROBE: CPT | Performed by: PHYSICIAN ASSISTANT

## 2024-10-22 PROCEDURE — 0241U POCT CEPHEID COV-2, FLU A/B, RSV - PCR: CPT | Performed by: PHYSICIAN ASSISTANT

## 2024-10-22 RX ORDER — BENZONATATE 100 MG/1
100 CAPSULE ORAL 3 TIMES DAILY PRN
Qty: 30 CAPSULE | Refills: 0 | Status: SHIPPED | OUTPATIENT
Start: 2024-10-22

## 2024-10-22 RX ORDER — ALBUTEROL SULFATE 90 UG/1
2 INHALANT RESPIRATORY (INHALATION) EVERY 6 HOURS PRN
Qty: 8.5 G | Refills: 0 | Status: SHIPPED | OUTPATIENT
Start: 2024-10-22

## 2024-10-22 ASSESSMENT — ENCOUNTER SYMPTOMS
CHILLS: 0
SHORTNESS OF BREATH: 0
SORE THROAT: 1
WHEEZING: 0
COUGH: 1
SPUTUM PRODUCTION: 1
FEVER: 0
PALPITATIONS: 0
HEMOPTYSIS: 0

## 2024-10-31 ENCOUNTER — OFFICE VISIT (OUTPATIENT)
Dept: URGENT CARE | Facility: PHYSICIAN GROUP | Age: 64
End: 2024-10-31
Payer: COMMERCIAL

## 2024-10-31 VITALS
RESPIRATION RATE: 18 BRPM | WEIGHT: 315 LBS | DIASTOLIC BLOOD PRESSURE: 64 MMHG | TEMPERATURE: 97.6 F | OXYGEN SATURATION: 95 % | BODY MASS INDEX: 50.18 KG/M2 | HEART RATE: 84 BPM | SYSTOLIC BLOOD PRESSURE: 120 MMHG

## 2024-10-31 DIAGNOSIS — U07.1 COVID-19: ICD-10-CM

## 2024-10-31 ASSESSMENT — ENCOUNTER SYMPTOMS
FEVER: 0
SINUS PAIN: 1

## 2025-06-04 ENCOUNTER — APPOINTMENT (OUTPATIENT)
Dept: MEDICAL GROUP | Facility: IMAGING CENTER | Age: 65
End: 2025-06-04
Payer: COMMERCIAL

## 2025-06-04 VITALS
BODY MASS INDEX: 42.66 KG/M2 | RESPIRATION RATE: 16 BRPM | HEART RATE: 66 BPM | OXYGEN SATURATION: 87 % | DIASTOLIC BLOOD PRESSURE: 62 MMHG | WEIGHT: 315 LBS | SYSTOLIC BLOOD PRESSURE: 110 MMHG | TEMPERATURE: 97.7 F | HEIGHT: 72 IN

## 2025-06-04 DIAGNOSIS — Z11.59 NEED FOR HEPATITIS C SCREENING TEST: ICD-10-CM

## 2025-06-04 DIAGNOSIS — Z85.46 HISTORY OF PROSTATE CANCER: ICD-10-CM

## 2025-06-04 DIAGNOSIS — E66.813 CLASS 3 SEVERE OBESITY WITH SERIOUS COMORBIDITY AND BODY MASS INDEX (BMI) OF 50.0 TO 59.9 IN ADULT, UNSPECIFIED OBESITY TYPE: ICD-10-CM

## 2025-06-04 DIAGNOSIS — R09.02 HYPOXIA: ICD-10-CM

## 2025-06-04 DIAGNOSIS — G47.33 OBSTRUCTIVE SLEEP APNEA SYNDROME: ICD-10-CM

## 2025-06-04 DIAGNOSIS — K45.8 RECURRENT ABDOMINAL HERNIA WITHOUT OBSTRUCTION OR GANGRENE, UNSPECIFIED HERNIA TYPE: ICD-10-CM

## 2025-06-04 DIAGNOSIS — Z00.01 ENCOUNTER FOR GENERAL ADULT MEDICAL EXAMINATION WITH ABNORMAL FINDINGS: Primary | ICD-10-CM

## 2025-06-04 DIAGNOSIS — Z11.4 SCREENING FOR HIV (HUMAN IMMUNODEFICIENCY VIRUS): ICD-10-CM

## 2025-06-04 DIAGNOSIS — R06.09 EXERTIONAL DYSPNEA: ICD-10-CM

## 2025-06-04 DIAGNOSIS — Z13.21 ENCOUNTER FOR VITAMIN DEFICIENCY SCREENING: ICD-10-CM

## 2025-06-04 DIAGNOSIS — Z12.11 SCREENING FOR COLON CANCER: ICD-10-CM

## 2025-06-04 PROBLEM — E66.9 OBESITY, UNSPECIFIED: Status: ACTIVE | Noted: 2022-09-22

## 2025-06-04 PROCEDURE — 99397 PER PM REEVAL EST PAT 65+ YR: CPT | Mod: 25 | Performed by: STUDENT IN AN ORGANIZED HEALTH CARE EDUCATION/TRAINING PROGRAM

## 2025-06-04 PROCEDURE — 99214 OFFICE O/P EST MOD 30 MIN: CPT | Performed by: STUDENT IN AN ORGANIZED HEALTH CARE EDUCATION/TRAINING PROGRAM

## 2025-06-04 PROCEDURE — 3074F SYST BP LT 130 MM HG: CPT | Performed by: STUDENT IN AN ORGANIZED HEALTH CARE EDUCATION/TRAINING PROGRAM

## 2025-06-04 PROCEDURE — 3078F DIAST BP <80 MM HG: CPT | Performed by: STUDENT IN AN ORGANIZED HEALTH CARE EDUCATION/TRAINING PROGRAM

## 2025-06-04 ASSESSMENT — PATIENT HEALTH QUESTIONNAIRE - PHQ9: CLINICAL INTERPRETATION OF PHQ2 SCORE: 0

## 2025-06-04 NOTE — PROGRESS NOTES
Subjective:     CC:   Chief Complaint   Patient presents with    Annual Exam    Weight Loss       HPI:   Shyam Escamilla is a pleasant 65 y.o. male who presents for an annual exam.     Verbal consent was acquired by the patient to use [employer] ambient listening note generation during this visit Yes         History of Present Illness  The patient presents for an annual wellness visit.    History of prostate cancer  He has a history of prostate cancer, for which he underwent radiation therapy. His last consultation with the urologist was less than a year ago, and he has not undergone any prostate surgeries. He is agreeable to having a PSA test done.     CARL  He has been using a CPAP machine for approximately 5 years for sleep apnea but believes it requires replacement due to unusual noises. He has not followed up with the specialist. He reports snoring and occasional fatigue but does not experience daytime tiredness. He has been observed to cease breathing during sleep. He does not have high blood pressure and is not on any antihypertensive medications. His BMI is around 35, and his neck circumference is 22.5 inches.    STOPBANG - Sleep Apnea Screening      Flowsheet Row Most Recent Value   S - Have you been told that you SNORE? Yes   T - Are you often TIRED during the day? No   O - Do you know if you stop breathing or has anyone witnessed you stop breathing while you were asleep? (OBSTRUCTION) Yes   P - Do you have high blood PRESSURE or on medication to control high blood pressure? No   B - Is your Body Mass Index greater than 35? (BMI) Yes   A - Are you 50 years old or older? (AGE) Yes   N - Are you a male with a NECK circumference greater than 17 inches, or a female with a neck circumference greater than 16 inches? Yes   G - Are you male? (GENDER) Yes   STOPBANG Total Score 6   CARL Risk High Risk          SOB  He experiences shortness of breath during physical activity but not at rest or during sleep. He  does not use supplemental oxygen with his CPAP machine. He has not completed the previously ordered chest x-ray and pulmonary function tests.    Hernia  He has noticed a bulge near the site of his previous umbilical hernia repair, which was performed 10 to 15 years ago in [place].    Weight  He has reduced his food intake and engages in daily 1-mile walks but has not observed any weight loss. He aims to achieve 7000 steps per day. He is considering the use of Ozempic for weight management.         Health Maintenance   Advanced directive:  n/a  PT/vit D for falls prevention: takes Vitamin D supplements  Cholesterol Screening: Lipid ordered  Diabetes Screening: A1C and CMP ordered  AAA Screening: n/a  Aspirin Use: no  Diet: has been eating less  Exercise: yes  Dental Care: yes  Eye exam:yes  Substance Abuse: denies  Safe in relationship:yes  Seat belts, bike helmet, gun safety discussed.  Sun protection discussed.     Cancer screening  Colorectal Cancer Screening: referral placed  Lung Cancer Screening: n/a  Prostate Cancer Screening/PSA: has history of prostate cancer. PSA ordered for surveillance       Infectious disease screening/Immunizations  --STI Screening: declined CT/NG screening  --Practices safe sex: yes  --HIV Screening:  HIV test ordered  --Hepatitis C Screening: Hep C test ordered today  --Immunizations:    Influenza: Out of season   HPV:  N/A   Tetanus: UTD   Shingles: Recommend at the pharmacy   Pneumococcal : declined by pt   Other immunizations:     He  has a past medical history of Prostate cancer (HCC).  He  has no past surgical history on file.  Family History   Problem Relation Age of Onset    Cancer Mother         Breast    Glaucoma Father     Cancer Brother         Prostate     Social History[1]    Patient Active Problem List    Diagnosis Date Noted    Exertional dyspnea 04/18/2024    History of prostate cancer 04/18/2024    Chronic pain of both knees 09/22/2022    Obesity, unspecified  09/22/2022    Sleep apnea 02/11/2016       Current Medications[2] (including changes today)  Allergies: Patient has no known allergies.    Review of Systems   Constitutional: Negative for fever, chills and malaise/fatigue.   HENT: Negative for congestion.    Eyes: Negative for pain.   Respiratory: Negative for cough and shortness of breath.    Cardiovascular: Negative for chest pain. Negative for leg swelling.   Gastrointestinal: Negative for nausea, vomiting, abdominal pain and diarrhea.   Genitourinary: Negative for dysuria and hematuria.   Skin: Negative for rash.   Neurological: Negative for dizziness, focal weakness and headaches.   Endo/Heme/Allergies: Does not bruise/bleed easily.   Psychiatric/Behavioral: Negative for depression.  The patient is not nervous/anxious.      Objective:     /62 (BP Location: Left arm, Patient Position: Sitting, BP Cuff Size: Large adult)   Pulse 66   Temp 36.5 °C (97.7 °F) (Temporal)   Resp 16   Ht 1.829 m (6')   Wt (!) 175 kg (385 lb)   SpO2 (!) 87% Comment: with ambulation. 2 minutes of walking  BMI 52.22 kg/m²   Body mass index is 52.22 kg/m².  Wt Readings from Last 4 Encounters:   06/04/25 (!) 175 kg (385 lb)   10/31/24 (!) 168 kg (370 lb)   10/22/24 (!) 167 kg (367 lb 12.8 oz)   05/29/24 (!) 165 kg (363 lb 9.6 oz)       Physical Exam:  Constitutional: Well-developed and well-nourished. Not diaphoretic. No distress.   Skin: Skin is warm and dry. No rash noted.  Head: Atraumatic without lesions.  Eyes: Conjunctivae and extraocular motions are normal. Pupils are equal, round, and reactive to light. No scleral icterus.   Ears:  External ears unremarkable. Tympanic membranes clear and intact.  Nose: Nares patent. Septum midline. Turbinates without erythema nor edema. No discharge.   Mouth/Throat: Dentition is normal. Tongue normal. Oropharynx is clear and moist. Posterior pharynx without erythema or exudates.  Neck: Supple, trachea midline. Normal range of motion. No  thyromegaly present. No lymphadenopathy--cervical or supraclavicular.  Cardiovascular: Regular rate and rhythm, S1 and S2 without murmur, rubs, or gallops.    Lungs: Effort normal. Clear to auscultation throughout. No adventitious sounds. No CVA tenderness.  Abdomen: Hernia palpated. Non reducible. Non tender. Soft, non tender, and without distention. Active bowel sounds in all four quadrants. No rebound or guarding.   Extremities: No cyanosis, clubbing, erythema, nor edema. Distal pulses intact and symmetric.   Musculoskeletal: All major joints AROM full in all directions without pain.  Neurological: Alert and oriented x 3. No cranial nerve deficit.  Sensation intact.  Psychiatric:  Behavior, mood, and affect are appropriate.        Assessment and Plan:       1. Encounter for general adult medical examination with abnormal findings (Primary)  Discussed anticipatory guidance as well as safe and healthy lifestyle habits. Recommend consuming a well balanced diet; low in fat, calories, and sugar, physical activity for at least 30 minutes daily or more days of the week, plenty of water throughout the day, sleep, sunscreen when outdoors especially from 10 AM to 4 PM, wearing a seatbelt while driving, and dental cleanings at least twice yearly.     2. Exertional dyspnea  3. Hypoxia  Chronic, stable. The etiology remains unknown. O2 sats dropped to 87% with ambulation.  Patient did not complete PFTs or chest x-ray.  Patient also did not follow-up with pulmonology and did not use oxygen as prescribed.  PFTs and chest x-ray reordered.  Referral to pulmonology placed.  [facility] order for portable oxygen 2L with supplies faxed to [employer].  Consider echocardiogram.  Strict ED precautions given.  - DX-CHEST-2 VIEWS; Future  - PULMONARY FUNCTION TESTS -Test requested: Complete Pulmonary Function Test; Future  - Pulmonary Stress Testing (6-minute walk); Future  - Referral to Pulmonary and Sleep Medicine      4. Class 3 severe  obesity with serious comorbidity and body mass index (BMI) of 50.0 to 59.9 in adult, unspecified obesity type  Chronic, established condition.  Uncontrolled.  BMI is 52.22.  Discussed lifestyle modifications to help with weight loss.  Patient did not complete his screening labs that were ordered previously.  New orders placed.  Consider Zepbound to help with weight loss.  - HEMOGLOBIN A1C; Future  - TSH WITH REFLEX TO FT4; Future  - Comp Metabolic Panel; Future  - CBC WITH DIFFERENTIAL; Future  - Lipid Profile; Future    5. Obstructive sleep apnea syndrome  Chronic, established condition, stable.  Patient however needs to update his CPAP machine.  Referral to sleep medicine placed.  - Referral to Pulmonary and Sleep Medicine    6. Recurrent abdominal hernia without obstruction or gangrene, unspecified hernia type  Recurrent issue.  Stable.  Referred to general surgery for consultation.  - Referral to General Surgery    7. Need for hepatitis C screening test  - HEP C VIRUS ANTIBODY; Future    8. Screening for HIV (human immunodeficiency virus)  - HIV AG/AB COMBO ASSAY SCREENING; Future    9. Encounter for vitamin deficiency screening  - VITAMIN D 25-HYDROXY    10. Screening for colon cancer  - Referral to GI for Colonoscopy    11. History of prostate cancer  - PROSTATE SPECIFIC AG SCREENING; Future         Return in about 2 weeks (around 6/18/2025) for test results.     Pt agreed with treatment plan and verbalized understanding.     Lakshmi Infante PA-C  [facility]    Please note that this dictation was created using voice recognition software. I have made every reasonable attempt to correct obvious errors, but I expect that there are errors of grammar and possibly content that I did not discover before finalizing the note.             [1]   Social History  Tobacco Use    Smoking status: Never    Smokeless tobacco: Never   Vaping Use    Vaping status: Never Used   Substance Use Topics    Alcohol use:  Not Currently    Drug use: Not Currently   [2]   Current Outpatient Medications   Medication Sig Dispense Refill    albuterol 108 (90 Base) MCG/ACT Aero Soln inhalation aerosol Inhale 2 Puffs every 6 hours as needed for Shortness of Breath. 8.5 g 0     No current facility-administered medications for this visit.

## 2025-06-11 ENCOUNTER — HOSPITAL ENCOUNTER (OUTPATIENT)
Facility: MEDICAL CENTER | Age: 65
End: 2025-06-11
Attending: STUDENT IN AN ORGANIZED HEALTH CARE EDUCATION/TRAINING PROGRAM
Payer: COMMERCIAL

## 2025-06-11 ENCOUNTER — RESULTS FOLLOW-UP (OUTPATIENT)
Dept: MEDICAL GROUP | Facility: IMAGING CENTER | Age: 65
End: 2025-06-11
Payer: COMMERCIAL

## 2025-06-11 ENCOUNTER — HOSPITAL ENCOUNTER (OUTPATIENT)
Dept: RADIOLOGY | Facility: MEDICAL CENTER | Age: 65
End: 2025-06-11
Attending: STUDENT IN AN ORGANIZED HEALTH CARE EDUCATION/TRAINING PROGRAM
Payer: COMMERCIAL

## 2025-06-11 DIAGNOSIS — Z85.46 HISTORY OF PROSTATE CANCER: ICD-10-CM

## 2025-06-11 DIAGNOSIS — Z11.4 SCREENING FOR HIV (HUMAN IMMUNODEFICIENCY VIRUS): ICD-10-CM

## 2025-06-11 DIAGNOSIS — R06.09 EXERTIONAL DYSPNEA: ICD-10-CM

## 2025-06-11 DIAGNOSIS — E66.813 CLASS 3 SEVERE OBESITY WITH SERIOUS COMORBIDITY AND BODY MASS INDEX (BMI) OF 50.0 TO 59.9 IN ADULT, UNSPECIFIED OBESITY TYPE: ICD-10-CM

## 2025-06-11 DIAGNOSIS — Z11.59 NEED FOR HEPATITIS C SCREENING TEST: ICD-10-CM

## 2025-06-11 DIAGNOSIS — E55.9 VITAMIN D DEFICIENCY: Primary | ICD-10-CM

## 2025-06-11 LAB
25(OH)D3 SERPL-MCNC: 12 NG/ML (ref 30–100)
ALBUMIN SERPL BCP-MCNC: 3.8 G/DL (ref 3.2–4.9)
ALBUMIN/GLOB SERPL: 1.3 G/DL
ALP SERPL-CCNC: 107 U/L (ref 30–99)
ALT SERPL-CCNC: 26 U/L (ref 2–50)
ANION GAP SERPL CALC-SCNC: 12 MMOL/L (ref 7–16)
AST SERPL-CCNC: 24 U/L (ref 12–45)
BASOPHILS # BLD AUTO: 0.7 % (ref 0–1.8)
BASOPHILS # BLD: 0.05 K/UL (ref 0–0.12)
BILIRUB SERPL-MCNC: 0.6 MG/DL (ref 0.1–1.5)
BUN SERPL-MCNC: 14 MG/DL (ref 8–22)
CALCIUM ALBUM COR SERPL-MCNC: 9 MG/DL (ref 8.5–10.5)
CALCIUM SERPL-MCNC: 8.8 MG/DL (ref 8.5–10.5)
CHLORIDE SERPL-SCNC: 108 MMOL/L (ref 96–112)
CHOLEST SERPL-MCNC: 133 MG/DL (ref 100–199)
CO2 SERPL-SCNC: 21 MMOL/L (ref 20–33)
CREAT SERPL-MCNC: 0.81 MG/DL (ref 0.5–1.4)
EOSINOPHIL # BLD AUTO: 0.11 K/UL (ref 0–0.51)
EOSINOPHIL NFR BLD: 1.5 % (ref 0–6.9)
ERYTHROCYTE [DISTWIDTH] IN BLOOD BY AUTOMATED COUNT: 46.9 FL (ref 35.9–50)
EST. AVERAGE GLUCOSE BLD GHB EST-MCNC: 128 MG/DL
FASTING STATUS PATIENT QL REPORTED: NORMAL
GFR SERPLBLD CREATININE-BSD FMLA CKD-EPI: 98 ML/MIN/1.73 M 2
GLOBULIN SER CALC-MCNC: 2.9 G/DL (ref 1.9–3.5)
GLUCOSE SERPL-MCNC: 108 MG/DL (ref 65–99)
HBA1C MFR BLD: 6.1 % (ref 4–5.6)
HCT VFR BLD AUTO: 50.3 % (ref 42–52)
HCV AB SER QL: NORMAL
HDLC SERPL-MCNC: 44 MG/DL
HGB BLD-MCNC: 16.6 G/DL (ref 14–18)
HIV 1+2 AB+HIV1 P24 AG SERPL QL IA: NORMAL
IMM GRANULOCYTES # BLD AUTO: 0.03 K/UL (ref 0–0.11)
IMM GRANULOCYTES NFR BLD AUTO: 0.4 % (ref 0–0.9)
LDLC SERPL CALC-MCNC: 72 MG/DL
LYMPHOCYTES # BLD AUTO: 1.58 K/UL (ref 1–4.8)
LYMPHOCYTES NFR BLD: 20.9 % (ref 22–41)
MCH RBC QN AUTO: 30.8 PG (ref 27–33)
MCHC RBC AUTO-ENTMCNC: 33 G/DL (ref 32.3–36.5)
MCV RBC AUTO: 93.3 FL (ref 81.4–97.8)
MONOCYTES # BLD AUTO: 0.53 K/UL (ref 0–0.85)
MONOCYTES NFR BLD AUTO: 7 % (ref 0–13.4)
NEUTROPHILS # BLD AUTO: 5.27 K/UL (ref 1.82–7.42)
NEUTROPHILS NFR BLD: 69.5 % (ref 44–72)
NRBC # BLD AUTO: 0 K/UL
NRBC BLD-RTO: 0 /100 WBC (ref 0–0.2)
PLATELET # BLD AUTO: 269 K/UL (ref 164–446)
PMV BLD AUTO: 9.9 FL (ref 9–12.9)
POTASSIUM SERPL-SCNC: 4.2 MMOL/L (ref 3.6–5.5)
PROT SERPL-MCNC: 6.7 G/DL (ref 6–8.2)
PSA SERPL-MCNC: 2.79 NG/ML (ref 0–4)
RBC # BLD AUTO: 5.39 M/UL (ref 4.7–6.1)
SODIUM SERPL-SCNC: 141 MMOL/L (ref 135–145)
TRIGL SERPL-MCNC: 86 MG/DL (ref 0–149)
TSH SERPL DL<=0.005 MIU/L-ACNC: 2.99 UIU/ML (ref 0.38–5.33)
WBC # BLD AUTO: 7.6 K/UL (ref 4.8–10.8)

## 2025-06-11 PROCEDURE — 80061 LIPID PANEL: CPT

## 2025-06-11 PROCEDURE — 87389 HIV-1 AG W/HIV-1&-2 AB AG IA: CPT

## 2025-06-11 PROCEDURE — 84443 ASSAY THYROID STIM HORMONE: CPT

## 2025-06-11 PROCEDURE — 82306 VITAMIN D 25 HYDROXY: CPT

## 2025-06-11 PROCEDURE — 36415 COLL VENOUS BLD VENIPUNCTURE: CPT

## 2025-06-11 PROCEDURE — 83036 HEMOGLOBIN GLYCOSYLATED A1C: CPT

## 2025-06-11 PROCEDURE — 71046 X-RAY EXAM CHEST 2 VIEWS: CPT

## 2025-06-11 PROCEDURE — 85025 COMPLETE CBC W/AUTO DIFF WBC: CPT

## 2025-06-11 PROCEDURE — 84153 ASSAY OF PSA TOTAL: CPT

## 2025-06-11 PROCEDURE — 86803 HEPATITIS C AB TEST: CPT

## 2025-06-11 PROCEDURE — 80053 COMPREHEN METABOLIC PANEL: CPT

## 2025-06-11 RX ORDER — CHOLECALCIFEROL (VITAMIN D3) 1250 MCG
1 CAPSULE ORAL
Qty: 12 CAPSULE | Refills: 0 | Status: SHIPPED | OUTPATIENT
Start: 2025-06-11

## 2025-06-13 NOTE — Clinical Note
REFERRAL APPROVAL NOTICE         Sent on June 13, 2025                   Shyam Escamilla  [address] [place]  [place] [address]                   Dear Mr. Escamilla,    After a careful review of the medical information and benefit coverage, [facility] has processed your referral. See below for additional details.    If applicable, you must be actively enrolled with your insurance for coverage of the authorized service. If you have any questions regarding your coverage, please contact your insurance directly.    REFERRAL INFORMATION   Referral #:  [id number]  Referred-To Provider    Referred-By Provider:  Gastroenterology    Lakshmi Infante P.A.-C.   [facility]      [address] [place] [id number] [phone] [address] [place] [id number] [phone]    Referral Start Date:  06/04/2025  Referral End Date:   06/04/2026             SCHEDULING  If you do not already have an appointment, please call [phone] to make an appointment.     MORE INFORMATION  If you do not already have a [employer] account, sign up at: [email or web address]  You can access your medical information, make appointments, see lab results, billing information, and more.  If you have questions regarding this referral, please contact  the [facility] Referrals department at:             [phone]. Monday - Friday 8:00AM - 5:00PM.     Sincerely,    [facility]

## 2025-06-13 NOTE — Clinical Note
REFERRAL APPROVAL NOTICE         Sent on June 13, 2025                   Shyam Escamilla  [address] [place]  [place] [place] [facility] [address]                   Dear Mr. Escamilla,    After a careful review of the medical information and benefit coverage, [facility] has processed your referral. See below for additional details.    If applicable, you must be actively enrolled with your insurance for coverage of the authorized service. If you have any questions regarding your coverage, please contact your insurance directly.    REFERRAL INFORMATION   Referral #:  [id number]  Referred-To Department    Referred-By Provider:  Pulmonary and Sleep Medicine    Lakshmi Infante P.A.-C.   Pulmonary Rehab [facility]      [address] [place] [address]  [phone] [address] [place] [address]  [phone]    Referral Start Date:  06/04/2025  Referral End Date:   06/04/2026             SCHEDULING  If you do not already have an appointment, please call [phone] to make an appointment.     MORE INFORMATION  If you do not already have a [employer] account, sign up at: [email or web address]  You can access your medical information, make appointments, see lab results, billing information, and more.  If you have questions regarding this referral, please contact  the [facility] Referrals department at:             [phone]. Monday - Friday 8:00AM - 5:00PM.     Sincerely,    [facility]

## 2025-06-13 NOTE — Clinical Note
REFERRAL APPROVAL NOTICE         Sent on June 13, 2025                   Shyam Escamilla  [address] [place]  [place] [place] [facility] [address]                   Dear Mr. Escamilla,    After a careful review of the medical information and benefit coverage, [facility] has processed your referral. See below for additional details.    If applicable, you must be actively enrolled with your insurance for coverage of the authorized service. If you have any questions regarding your coverage, please contact your insurance directly.    REFERRAL INFORMATION   Referral #:  [id number]  Referred-To Department    Referred-By Provider:  Pulmonary and Sleep Medicine    Lakshmi Infante P.A.-C.   Pulmonary Rehab [facility]      [address] [place] [address]  [phone] [address] [place] [address]  [phone]    Referral Start Date:  06/04/2025  Referral End Date:   06/04/2026             SCHEDULING  If you do not already have an appointment, please call [phone] to make an appointment.     MORE INFORMATION  If you do not already have a [employer] account, sign up at: [email or web address]  You can access your medical information, make appointments, see lab results, billing information, and more.  If you have questions regarding this referral, please contact  the [facility] Referrals department at:             [phone]. Monday - Friday 8:00AM - 5:00PM.     Sincerely,    [facility]

## 2025-06-13 NOTE — Clinical Note
REFERRAL APPROVAL NOTICE         Sent on June 13, 2025                   Shyam Escamilla  [address]  [place] [facility] [address]                   Dear Mr. Escamilla,    After a careful review of the medical information and benefit coverage, [facility] has processed your referral. See below for additional details.    If applicable, you must be actively enrolled with your insurance for coverage of the authorized service. If you have any questions regarding your coverage, please contact your insurance directly.    REFERRAL INFORMATION   Referral #:  [id number]  Referred-To Department    Referred-By Provider:  General Surgery    Lakshmi Infante P.A.-C.   Department Of Surgery      [address] [place] [address]  [phone] [address]  [place] [id number] [phone]    Referral Start Date:  06/04/2025  Referral End Date:   06/04/2026             SCHEDULING  If you do not already have an appointment, please call [phone] to make an appointment.     MORE INFORMATION  If you do not already have a [employer] account, sign up at: [email or web address]  You can access your medical information, make appointments, see lab results, billing information, and more.  If you have questions regarding this referral, please contact  the [facility] Referrals department at:             [phone]. Monday - Friday 8:00AM - 5:00PM.     Sincerely,    [facility]

## 2025-06-16 NOTE — Clinical Note
REFERRAL APPROVAL NOTICE         Sent on June 16, 2025                   Shyam sEcamilla  [address] [place]  [facility] [address]                   Dear Mr. Escamilla,    After a careful review of the medical information and benefit coverage, [facility] has processed your referral. See below for additional details.    If applicable, you must be actively enrolled with your insurance for coverage of the authorized service. If you have any questions regarding your coverage, please contact your insurance directly.    REFERRAL INFORMATION   Referral #:  [id number]  Referred-To Department    Referred-By Provider:  Pulmonary and Sleep Medicine    Lakshmi Infante P.A.-C.   Pulmonary/sleep [facility]      [address] [place] [id number] [phone] [address] [place] [address] [address]  [place] [address] [phone]    Referral Start Date:  06/04/2025  Referral End Date:   06/04/2026           SCHEDULING  If you do not already have an appointment, please call [phone] to make an appointment.   MORE INFORMATION  As a reminder, [facility] ownership has changed, meaning this location is now owned and operated by [facility]. As such, we want to clarify that our patients should expect to receive two separate bills for the services received at [facility] - one representing the [facility] facility fees as the owner of the establishment, and the other to represent the physician's services and subsequent fees. You can speak with your insurance carrier for a pricing estimate by calling the customer service number on the back of your card and ask about charges for a hospital outpatient visit.  If you do not already have a [employer] account, sign up at: [email or web address]  You can access your medical information, make appointments, see lab results, billing  information, and more.  If you have questions regarding this referral, please contact  the [facility] department at:             [phone]. Monday - Friday 7:30AM - 5:00PM.      Sincerely,  [facility]

## 2025-06-16 NOTE — Clinical Note
REFERRAL APPROVAL NOTICE         Sent on June 16, 2025                   Shyam Escamilla  [address] [place]  [place] [address]                   Dear Mr. Escamilla,    After a careful review of the medical information and benefit coverage, [facility] has processed your referral. See below for additional details.    If applicable, you must be actively enrolled with your insurance for coverage of the authorized service. If you have any questions regarding your coverage, please contact your insurance directly.    REFERRAL INFORMATION   Referral #:  [id number]  Referred-To Department    Referred-By Provider:  Pulmonary and Sleep Medicine    Lakshmi Infante P.A.-C.   Pulmonary/sleep [facility]      [address] [place] [id number] [phone] [address] [place] [address] [address]  [place] [address] [phone]    Referral Start Date:  06/04/2025  Referral End Date:   06/04/2026           SCHEDULING  If you do not already have an appointment, please call [phone] to make an appointment.   MORE INFORMATION  As a reminder, [facility] ownership has changed, meaning this location is now owned and operated by [facility]. As such, we want to clarify that our patients should expect to receive two separate bills for the services received at [facility] - one representing the [facility] facility fees as the owner of the establishment, and the other to represent the physician's services and subsequent fees. You can speak with your insurance carrier for a pricing estimate by calling the customer service number on the back of your card and ask about charges for a hospital outpatient visit.  If you do not already have a [employer] account, sign up at: [email or web address]  You can access your medical information, make appointments, see lab results, billing  information, and more.  If you have questions regarding this referral, please contact  the [facility] department at:             [phone]. Monday - Friday 7:30AM - 5:00PM.      Sincerely,  [facility]

## 2025-06-25 ENCOUNTER — OFFICE VISIT (OUTPATIENT)
Dept: MEDICAL GROUP | Facility: IMAGING CENTER | Age: 65
End: 2025-06-25
Payer: COMMERCIAL

## 2025-06-25 VITALS
TEMPERATURE: 97.9 F | DIASTOLIC BLOOD PRESSURE: 68 MMHG | SYSTOLIC BLOOD PRESSURE: 102 MMHG | RESPIRATION RATE: 14 BRPM | OXYGEN SATURATION: 95 % | BODY MASS INDEX: 42.66 KG/M2 | WEIGHT: 315 LBS | HEIGHT: 72 IN | HEART RATE: 60 BPM

## 2025-06-25 DIAGNOSIS — R73.03 PREDIABETES: ICD-10-CM

## 2025-06-25 DIAGNOSIS — E55.9 VITAMIN D DEFICIENCY: ICD-10-CM

## 2025-06-25 DIAGNOSIS — R06.09 EXERTIONAL DYSPNEA: ICD-10-CM

## 2025-06-25 DIAGNOSIS — E66.813 CLASS 3 SEVERE OBESITY WITH SERIOUS COMORBIDITY AND BODY MASS INDEX (BMI) OF 50.0 TO 59.9 IN ADULT, UNSPECIFIED OBESITY TYPE: Primary | ICD-10-CM

## 2025-06-25 DIAGNOSIS — M25.562 CHRONIC PAIN OF BOTH KNEES: ICD-10-CM

## 2025-06-25 DIAGNOSIS — G89.29 CHRONIC PAIN OF BOTH KNEES: ICD-10-CM

## 2025-06-25 DIAGNOSIS — Z91.89 AT RISK FOR HEART DISEASE: ICD-10-CM

## 2025-06-25 DIAGNOSIS — M25.561 CHRONIC PAIN OF BOTH KNEES: ICD-10-CM

## 2025-06-25 DIAGNOSIS — R74.8 HIGH ALKALINE PHOSPHATASE LEVEL: ICD-10-CM

## 2025-06-25 DIAGNOSIS — G47.33 OSA (OBSTRUCTIVE SLEEP APNEA): ICD-10-CM

## 2025-06-25 PROCEDURE — 3078F DIAST BP <80 MM HG: CPT | Performed by: STUDENT IN AN ORGANIZED HEALTH CARE EDUCATION/TRAINING PROGRAM

## 2025-06-25 PROCEDURE — 99214 OFFICE O/P EST MOD 30 MIN: CPT | Performed by: STUDENT IN AN ORGANIZED HEALTH CARE EDUCATION/TRAINING PROGRAM

## 2025-06-25 PROCEDURE — 3074F SYST BP LT 130 MM HG: CPT | Performed by: STUDENT IN AN ORGANIZED HEALTH CARE EDUCATION/TRAINING PROGRAM

## 2025-06-25 RX ORDER — MELOXICAM 7.5 MG/1
7.5 TABLET ORAL DAILY
Qty: 30 TABLET | Refills: 2 | Status: SHIPPED | OUTPATIENT
Start: 2025-06-25

## 2025-06-25 RX ORDER — TIRZEPATIDE 2.5 MG/.5ML
2.5 INJECTION, SOLUTION SUBCUTANEOUS
Qty: 2 ML | Refills: 1 | Status: SHIPPED | OUTPATIENT
Start: 2025-06-25

## 2025-06-25 RX ORDER — TAMSULOSIN HYDROCHLORIDE 0.4 MG/1
1 CAPSULE ORAL
COMMUNITY

## 2025-06-25 ASSESSMENT — FIBROSIS 4 INDEX: FIB4 SCORE: 1.14

## 2025-06-25 NOTE — PROGRESS NOTES
Subjective:     CC:   Chief Complaint   Patient presents with    Lab Results     Labs and Imaging 6/11    Medication Management     Medication for knee inflammation        HPI:     Verbal consent was acquired by the patient to use [employer] ambient listening note generation during this visit Yes      Shyam Easleyence reno Escamilla, 65 y.o., male,  presents today to discuss:     History of Present Illness    Dyspnea follow up   He has been experiencing persistent shortness of breath since his COVID-19 infection, but reports no exacerbation of this symptom. He does not report any cyanosis of the hands or lips. His oxygen level dropped to 87% during a walking test at his last visit, for which a prescription for portable oxygen was ordered, but he has not yet received it. He has an appointment with a sleep medicine provider on 07/15/2025 and a pulmonary function test scheduled for 08/14/2025.    Knee pain  He has not consulted an orthopedic specialist for his knee pain, which he attributes to his weight. His mobility is limited due to the knee pain, although he manages to walk a mile each morning. He also engages in weightlifting exercises. He was previously prescribed meloxicam for knee pain, which he found effective, but he has since run out of the medication.            ROS:  Denies CP, fever, and chills    Medications, allergies, past medical history, family history, surgical history, and social history documented in chart and reviewed by me.       Objective:   Exam:  /68 (BP Location: Left arm, Patient Position: Sitting, BP Cuff Size: Adult)   Pulse 60   Temp 36.6 °C (97.9 °F) (Temporal)   Resp 14   Ht 1.829 m (6')   Wt (!) 175 kg (385 lb)   SpO2 95%   BMI 52.22 kg/m²      General: In no acute distress. Normal appearance.   Head:   Atraumatic, normocephalic.   Neck: Supple without lymphadenopathy.   Pulmonary: Normal effort, clear to auscultation bilaterally, no wheezes, rhonchi, or  rales  Cardiovascular:   Regular rate and rhythm. No murmurs, rubs, or gallops.  Musculoskeletal:  Normal ROM. No edema.    Skin: No visible rashes or lesions.  Neurological: Alert and oriented to person, place, and time. Gait normal.   Psychiatric: Normal mood and affect. Calm and friendly behavior. Speech clear. Normal judgement and insight.         Assessment & Plan:       Assessment & Plan    1. Class 3 severe obesity with serious comorbidity and body mass index (BMI) of 50.0 to 59.9 in adult, unspecified obesity type (Primary)  Chronic. Established condition. Uncontrolled.  Patient is experiencing difficulty losing weight despite implementing lifestyle modifications.  Patient would like to trial Zepbound.  Denies personal history of pancreatitis, gallbladder disease, and retinopathy. Also denies family history of medullary thyroid carcinoma or multiple endocrine neoplasia syndrome type 2 (MEN2). Will trial Zepbound 2.5mg , the dose can be increased every 4 weeks until maintenance dose is achieved. Discussed medication and possible side effects such as abdominal pain, nausea, vomiting, pyrosis, eructation, diarrhea, constipation, headache, and discomfort at the injection site.  Recommend small but frequent meals to help minimize the GI side effects.  Encouraged healthy diet and exercise for at least 30 minutes daily or most days of the week.  Recommend weight lifting to help prevent muscle mass loss. Recommend yearly eye examinations or sooner if having blurry vision.  Recommend follow-up in 4 weeks or sooner if needed.    - Tirzepatide-Weight Management (ZEPBOUND) 2.5 MG/0.5ML Solution Auto-injector; Inject 2.5 mg under the skin every 7 days.  Dispense: 2 mL; Refill: 1    2. Prediabetes  New diagnosis, A1C is 6.1. Emphasized the importance of commencing lifestyle changes to help prevent diabetes and other commodities.  Recommend a diet low in carbs/sugars, follow MyPlate guidelines, and to exercise every day or  most days of the week for at least 30 minutes.  Patient will trial Zepbound for weight loss as stated above for his weight which can also help manage glucose.  Recommend repeating A1c in 3 months.  - Tirzepatide-Weight Management (ZEPBOUND) 2.5 MG/0.5ML Solution Auto-injector; Inject 2.5 mg under the skin every 7 days.  Dispense: 2 mL; Refill: 1  - HEMOGLOBIN A1C; Future      4. Vitamin D deficiency  Acute.  New finding.  Vitamin D is 12.  Prescription for vitamin D 50,000 IU once a week has been provided, to be taken with food; sunshine exposure also recommended. Recheck of vitamin D levels will be conducted in 3 months. Monitoring for improvement in symptoms related to deficiency.  - VITAMIN D,25 HYDROXY (DEFICIENCY); Future    5. High alkaline phosphatase level  Acute.  New finding. Alkaline phosphatase is 107.  Suspect secondary to vitamin D deficiency.  Will treat the vitamin D deficiency.  Recommend monitoring.    6. At risk for heart disease  The 10-year ASCVD risk score (Charlie DK, et al., 2019) is: 7%.  Discussed statin versus CT cardiac score.  He would like to do CT cardiac score first.  Encouraged healthy diet and exercise.  Lipid panel is normal.  - CT-CARDIAC SCORING; Future    7. Exertional dyspnea  8. CARL (obstructive sleep apnea)  Chronic, stable.  Chest x-ray is unremarkable.  Patient has upcoming appointment with sleep medicine and for PFT.  Recommend to schedule appointment with pulmonology as well.  - Tirzepatide-Weight Management (ZEPBOUND) 2.5 MG/0.5ML Solution Auto-injector; Inject 2.5 mg under the skin every 7 days.  Dispense: 2 mL; Refill: 1    9. Chronic pain of both knees  Chronic, established condition.  Stable.  Patient manages the pain with meloxicam 7.5 mg daily.  He declined additional interventions at this time.  - meloxicam (MOBIC) 7.5 MG Tab; Take 1 Tablet by mouth every day.  Dispense: 30 Tablet; Refill: 2                  Diagnosis and treatment plan explained to pt. Counseled  pt on new medication(s) and potential side effects. Pt agreed with treatment plan and verbalized understanding. All questions were answered to the best of my ability.     Return in about 4 weeks (around 7/23/2025) for weight check.     Please note that this dictation was created using voice recognition software. I have made every reasonable attempt to correct obvious errors, but I expect that there are errors of grammar and possibly content that I did not discover before finalizing the note.    Lakshmi Infante PA-C  [facility]

## 2025-07-01 ENCOUNTER — HOSPITAL ENCOUNTER (OUTPATIENT)
Dept: RADIOLOGY | Facility: MEDICAL CENTER | Age: 65
End: 2025-07-01
Attending: STUDENT IN AN ORGANIZED HEALTH CARE EDUCATION/TRAINING PROGRAM
Payer: COMMERCIAL

## 2025-07-01 DIAGNOSIS — Z91.89 AT RISK FOR HEART DISEASE: ICD-10-CM

## 2025-07-01 PROCEDURE — 4410556 CT-CARDIAC SCORING (SELF PAY ONLY)

## 2025-07-02 ENCOUNTER — RESULTS FOLLOW-UP (OUTPATIENT)
Dept: MEDICAL GROUP | Facility: IMAGING CENTER | Age: 65
End: 2025-07-02
Payer: COMMERCIAL

## 2025-07-02 DIAGNOSIS — E55.9 VITAMIN D DEFICIENCY: ICD-10-CM

## 2025-07-03 DIAGNOSIS — R93.1 AGATSTON CAC SCORE, <100: Primary | ICD-10-CM

## 2025-07-03 RX ORDER — ROSUVASTATIN CALCIUM 20 MG/1
20 TABLET, COATED ORAL EVERY EVENING
Qty: 90 TABLET | Refills: 3 | Status: SHIPPED | OUTPATIENT
Start: 2025-07-03 | End: 2025-07-03

## 2025-07-03 RX ORDER — ROSUVASTATIN CALCIUM 10 MG/1
10 TABLET, COATED ORAL EVERY EVENING
Qty: 100 TABLET | Refills: 3 | Status: SHIPPED | OUTPATIENT
Start: 2025-07-03 | End: 2026-08-07

## 2025-07-15 ENCOUNTER — OFFICE VISIT (OUTPATIENT)
Dept: SLEEP MEDICINE | Facility: MEDICAL CENTER | Age: 65
End: 2025-07-15
Attending: STUDENT IN AN ORGANIZED HEALTH CARE EDUCATION/TRAINING PROGRAM
Payer: COMMERCIAL

## 2025-07-15 VITALS
DIASTOLIC BLOOD PRESSURE: 88 MMHG | HEART RATE: 90 BPM | BODY MASS INDEX: 42.66 KG/M2 | SYSTOLIC BLOOD PRESSURE: 128 MMHG | OXYGEN SATURATION: 90 % | WEIGHT: 315 LBS | HEIGHT: 72 IN | RESPIRATION RATE: 16 BRPM

## 2025-07-15 DIAGNOSIS — G47.33 OSA ON CPAP: Primary | ICD-10-CM

## 2025-07-15 PROCEDURE — 99213 OFFICE O/P EST LOW 20 MIN: CPT

## 2025-07-15 PROCEDURE — 99204 OFFICE O/P NEW MOD 45 MIN: CPT

## 2025-07-15 PROCEDURE — 3079F DIAST BP 80-89 MM HG: CPT

## 2025-07-15 PROCEDURE — 3074F SYST BP LT 130 MM HG: CPT

## 2025-07-15 ASSESSMENT — FIBROSIS 4 INDEX: FIB4 SCORE: 1.14

## 2025-07-15 NOTE — PROGRESS NOTES
[facility] Consult Note     Date: 7/15/2025 / Time: 1:09 PM      Thank you for requesting a sleep medicine consultation on Shyam Escamilla at the sleep center. Presents today with the chief complaints of previous diagnosis of CARL. He is referred by PCP for evaluation and treatment of sleep disorder breathing.       HISTORY OF PRESENT ILLNESS:     Shyam Escamilla is a 65 y.o. male with focused history of CARL, elevated BMI, preDM, never smoker.  Shyam presents to Sleep Clinic with previous diagnosis of CARL and to establish care with sleep medicine .       Patient was diagnosed with CARL around 2008/2009. At that time, patient was started on CPAP.    Patient uses the machine nightly and reports benefit. Patient reports that he has started waking up during the night and it has been making some noises. Current machine is from his original diagnosis. Patient denies aerophagia, residual snoring, skin irritation , dry mouth , and excessive leak .     DME provider: none  Device: unable to confirm   Settings: unable to confirm   Mask: nasal   Chin strap: no      Started walking a mile a day before work to help with weight loss. Hoping to stretch this to longer distances.      Pertinent medications:  Zepbound was prescribed but has not been approved thus far     As per supplemental questionnaire to be scanned or imported into chart:    [place] Sleepiness Score: 10    Sleep Schedule  Bedtime: Weekday 9 PM Weekend 11 PM  Wake time: Weekday 4 AM Weekend 6 AM  Sleep-onset latency: 15-20 min  Awakenings from sleep: 1  Difficulty falling back asleep: no  Bedroom partner: yes  Naps: no    DAYTIME SYMPTOMS:   Excessive daytime sleepiness: no  Daytime fatigue: no  Difficulty concentrating: no  Memory problems: no  Irritability:no  Work/school performance issues: no  Sleepiness with driving: no  Caffeine/stimulant use: Yes, How Many? 1/day  Alcohol use:No     SLEEP RELATED SYMPTOMS  Snoring: no - not on  "PAP  Witnessed apnea or gasping/choking: no - not on PAP  Dry mouth or mouth breathing: no  Sweating: no  Teeth grinding/biting: no  Morning headaches: no  Refreshed Upon Awakening: yes     SLEEP RELATED BEHAVIORS:  Parasomnias (walking, talking, eating, violence): No   Leg kicking: no  Restless legs - \"urge to move\": no  Nightmares: no Recurrent: No   Dream enactment: No      NARCOLEPSY:  Cataplexy: No   Sleep paralysis: No   Sleep attacks: No   Hypnagogic/hypnopompic hallucinations: No     Occupation:      MEDICAL HISTORY  Past Medical History[1]     SURGICAL HISTORY  Past Surgical History[2]     FAMILY HISTORY  Family History   Problem Relation Age of Onset    Cancer Mother         Breast    Glaucoma Father     Cancer Brother         Prostate       SOCIAL HISTORY  Social History[3]       CURRENT MEDICATIONS  Current Outpatient Medications   Medication Sig    rosuvastatin (CRESTOR) 10 MG Tab Take 1 Tablet by mouth every evening.    meloxicam (MOBIC) 7.5 MG Tab Take 1 Tablet by mouth every day.    Cholecalciferol (VITAMIN D3) 1.25 MG (94560 UT) Cap Take 1 Capsule by mouth every 7 days.    albuterol 108 (90 Base) MCG/ACT Aero Soln inhalation aerosol Inhale 2 Puffs every 6 hours as needed for Shortness of Breath.    Tirzepatide-Weight Management (ZEPBOUND) 2.5 MG/0.5ML Solution Auto-injector Inject 2.5 mg under the skin every 7 days. (Patient not taking: Reported on 7/15/2025)       REVIEW OF SYSTEMS  Constitutional: Denies fevers, Denies weight changes  Ears/Nose/Throat/Mouth: Denies nasal congestion or sore throat   Cardiovascular: Denies chest pain  Respiratory: Denies shortness of breath, Denies cough  Gastrointestinal/Hepatic: Denies nausea, vomiting  Sleep: see HPI    Physical Examination:  Vitals/ General Appearance:   Encounter Vitals  Blood Pressure : 128/88  Pulse: 90  Respiration: 16  Pulse Oximetry: 90 %  Weight: (!) 172 kg (380 lb) (per patient)  Height: 182.9 cm (6') (per " patient)  BMI (Calculated): 51.54    Pt. is alert and oriented to time, place and person. Cooperative and in no apparent distress.     Constitutional: Alert, no distress, well-groomed.  Skin: No rashes in visible areas.  Eye: Round. Conjunctiva clear, lids normal. No icterus.   ENT EXAM  Nasal septum deviation: No   Nasal turbinate hypertrophy: Left: Grade 2   Right: Grade 2  Nasal erythema: No   Oropharyngeal erythema No   Hard palate narrow: No   Hard palate high: Yes  Soft palate/uvula (Mallampati score): 3  Tongue Scalloping: Yes  Retrognathia: No   Micrognathia: No   Cardiovascular:normal S1 and S2 heart sounds, regular rate and rhythm  Pulmonary:Normal breath sounds, Clear to auscultation  Neurologic:Muscle tone normal, Awake, alert and oriented x 3  Extremities: No clubbing, cyanosis, or edema        Bicarb:   Lab Results   Component Value Date/Time    CO2 21 06/11/2025 1028    CO2 25 09/22/2022 0838     TSH:   Lab Results   Component Value Date/Time    TSHULTRASEN 2.990 06/11/2025 1028     CREATININE:   Lab Results   Component Value Date/Time    CREATININE 0.81 06/11/2025 1028     VIT D:   Lab Results   Component Value Date/Time    25HYDROXY 12 (L) 06/11/2025 1028     H/H:  Lab Results   Component Value Date/Time    HEMOGLOBIN 16.6 06/11/2025 10:28 AM         Medical Decision Making   Assessment and Plan  The medical record was reviewed but sleep studies are not available.    Obstructive sleep apnea  - Compliance data unavailable  - Current Settings unable to confirm, but notes from care everywhere mention that it was set 4-20 at some point. Recommend patient bring machine with him to follow up for data evaluation or sooner if adjustments need to be made.  - Will repeat sleep testing so patient can get a new machine and establish with local DME.   - Pt continues to use and benefit from machine.   - Orders placed for HST   - Advised to reach out via MyChart with questions     CARL/PAP EDUCATION:  - Clean  equipment frequently, and get new mask and supplies as allowed by insurance and DME.   - Avoid driving a motor vehicle when drowsy  - Patients with CARL are at increased risk of cardiovascular disease including coronary artery disease, systemic arterial hypertension, pulmonary arterial hypertension, cardiac arrythmias, and stroke. Positive airway pressure will favorably impact many of the adverse conditions and effects provoked by CARL.     2. Adult BMI 50.0-59.9 kg/sq m (HCC)  - Continue efforts to lose weight including healthy diet and moderate exercise 5 days a week for about 30 minutes.   - Start Zepbound as ordered if approved by insurance and FU with PCP for management.   - Patient stated he is not interested in surgical options for weight loss at this time.     Return in about 2 months (around 9/15/2025) for sleep study results .   - Recommend an earlier appointment, if significant treatment barriers develop.  - Patient to follow up with the appropriate healthcare practitioners for all other medical problems and issues.    Please note portions of this record was created using voice recognition software. I have made every reasonable attempt to correct obvious errors, but I expect that there are errors of grammar and possibly content I did not discover before finalizing the note.         [1]   Past Medical History:  Diagnosis Date    Prostate cancer (HCC)    [2] History reviewed. No pertinent surgical history.  [3]   Social History  Socioeconomic History    Marital status:    Tobacco Use    Smoking status: Never    Smokeless tobacco: Never   Vaping Use    Vaping status: Never Used   Substance and Sexual Activity    Alcohol use: Not Currently    Drug use: Not Currently    Sexual activity: Yes     Partners: Female     Birth control/protection: None

## 2025-07-22 ENCOUNTER — HOSPITAL ENCOUNTER (OUTPATIENT)
Facility: MEDICAL CENTER | Age: 65
End: 2025-07-22
Attending: STUDENT IN AN ORGANIZED HEALTH CARE EDUCATION/TRAINING PROGRAM
Payer: COMMERCIAL

## 2025-07-22 DIAGNOSIS — E55.9 VITAMIN D DEFICIENCY: ICD-10-CM

## 2025-07-22 DIAGNOSIS — R73.03 PREDIABETES: ICD-10-CM

## 2025-07-22 LAB
25(OH)D3 SERPL-MCNC: 25 NG/ML (ref 30–100)
EST. AVERAGE GLUCOSE BLD GHB EST-MCNC: 126 MG/DL
HBA1C MFR BLD: 6 % (ref 4–5.6)

## 2025-07-22 PROCEDURE — 82306 VITAMIN D 25 HYDROXY: CPT

## 2025-07-22 PROCEDURE — 36415 COLL VENOUS BLD VENIPUNCTURE: CPT

## 2025-07-22 PROCEDURE — 83036 HEMOGLOBIN GLYCOSYLATED A1C: CPT

## 2025-07-22 RX ORDER — CHOLECALCIFEROL (VITAMIN D3) 1250 MCG
1 CAPSULE ORAL
Qty: 12 CAPSULE | Refills: 0 | Status: SHIPPED | OUTPATIENT
Start: 2025-07-22

## 2025-07-28 ENCOUNTER — OFFICE VISIT (OUTPATIENT)
Dept: MEDICAL GROUP | Facility: IMAGING CENTER | Age: 65
End: 2025-07-28
Payer: COMMERCIAL

## 2025-07-28 VITALS
BODY MASS INDEX: 42.66 KG/M2 | SYSTOLIC BLOOD PRESSURE: 108 MMHG | HEART RATE: 74 BPM | OXYGEN SATURATION: 93 % | WEIGHT: 315 LBS | RESPIRATION RATE: 14 BRPM | TEMPERATURE: 98.5 F | DIASTOLIC BLOOD PRESSURE: 60 MMHG | HEIGHT: 72 IN

## 2025-07-28 DIAGNOSIS — R93.1 AGATSTON CAC SCORE, <100: ICD-10-CM

## 2025-07-28 DIAGNOSIS — E66.813 CLASS 3 SEVERE OBESITY WITH SERIOUS COMORBIDITY AND BODY MASS INDEX (BMI) OF 50.0 TO 59.9 IN ADULT, UNSPECIFIED OBESITY TYPE: Primary | ICD-10-CM

## 2025-07-28 DIAGNOSIS — G47.33 OSA (OBSTRUCTIVE SLEEP APNEA): ICD-10-CM

## 2025-07-28 DIAGNOSIS — E55.9 VITAMIN D DEFICIENCY: ICD-10-CM

## 2025-07-28 DIAGNOSIS — R73.03 PREDIABETES: ICD-10-CM

## 2025-07-28 PROCEDURE — 99214 OFFICE O/P EST MOD 30 MIN: CPT | Performed by: STUDENT IN AN ORGANIZED HEALTH CARE EDUCATION/TRAINING PROGRAM

## 2025-07-28 PROCEDURE — 3078F DIAST BP <80 MM HG: CPT | Performed by: STUDENT IN AN ORGANIZED HEALTH CARE EDUCATION/TRAINING PROGRAM

## 2025-07-28 PROCEDURE — 3074F SYST BP LT 130 MM HG: CPT | Performed by: STUDENT IN AN ORGANIZED HEALTH CARE EDUCATION/TRAINING PROGRAM

## 2025-07-28 ASSESSMENT — FIBROSIS 4 INDEX: FIB4 SCORE: 1.14

## 2025-07-28 NOTE — PROGRESS NOTES
Subjective:     CC:   Chief Complaint   Patient presents with    Weight Check       HPI:     Verbal consent was acquired by the patient to use [employer] ambient listening note generation during this visit: Yest    reno Freeman, 65 y.o., male,  presents today to discuss:     History of Present Illness    He has been unable to obtain his prescribed medication due to it being denied by insurance. He was informed that a prior authorization is required. An upcoming sleep study is scheduled for 08/08/2025. He has not yet received a response from the oxygen company.    Rosuvastatin 10 mg is taken nightly as prescribed. He recently completed his lab tests, which showed a decrease in A1c from 6.1 to 6.0.    Vitamin D3 is taken once a week. He has been engaging in outdoor activities such as walking and sun exposure, which he believes have contributed to an increase in his vitamin D levels from 12 to 25.           ROS:  Denies CP and SOB.    Medications, allergies, past medical history, family history, surgical history, and social history documented in chart and reviewed by me.       Objective:   Exam:  /60 (BP Location: Left arm, Patient Position: Sitting, BP Cuff Size: Large adult)   Pulse 74   Temp 36.9 °C (98.5 °F) (Temporal)   Resp 14   Ht 1.829 m (6')   Wt (!) 175 kg (386 lb)   SpO2 93%   BMI 52.35 kg/m²      General: In no acute distress. Normal appearance.   Head:   Atraumatic, normocephalic.   Neck: Supple without lymphadenopathy.   Pulmonary: Normal effort, clear to auscultation bilaterally, no wheezes, rhonchi, or rales  Cardiovascular:   Regular rate and rhythm. No murmurs, rubs, or gallops.  Musculoskeletal:  Normal ROM. No edema.    Skin: No visible rashes or lesions.  Neurological: Alert and oriented to person, place, and time. Gait normal.   Psychiatric: Normal mood and affect. Calm and friendly behavior. Speech clear. Normal judgement and insight.         Assessment & Plan:        Assessment & Plan    1. Class 3 severe obesity with serious comorbidity and body mass index (BMI) of 50.0 to 59.9 in adult, unspecified obesity type (Primary)  Chronic, established condition.  Stable.  The patient's weight is being monitored as part of his overall health management. The prior authorization for Zepbound prescription has been submitted, including the last office note and the pulmonology note. A response from the insurance company is expected within 3 to 5 days. If approved, a follow-up appointment in 4 weeks is recommended to assess the effectiveness of the medication. If the insurance company denies the request, an alternative treatment plan will be discussed.  Encouraged healthy diet and exercise.    2. [place] CAC score, <100  Acute. New finding. CT calcium score was reviewed today. His total calcium score is 68.6.  Patient initiated rosuvastatin 10 mg as prescribed.  Has been tolerating medication well.    3. CARL (obstructive sleep apnea)  Chronic, established condition.  Stable. Patient was evaluated by pulmonology. The patient is scheduled for a new sleep test on 08/08/2025. He is also awaiting a response from the oxygen company. The contact information for the oxygen company was provided to him.    4. Prediabetes  Chronic, established condition.  Slight improvement.  A1c is 6.0.  We are hoping insurance will approve Zepbound which will help manage blood sugar.  If insurance denies Zepbound, consider metformin.  Encouraged healthy diet and exercise.    5. Vitamin D deficiency  Chronic, improving.The patient's vitamin D level has improved from 12 to 25. He is advised to complete the current prescription of vitamin D3 and then transition to an over-the-counter supplement of 1000 or 2000 IU.             Pt agreed with treatment plan and verbalized understanding. All questions were answered to the best of my ability.     Return in about 4 weeks (around 8/25/2025) for weight check.     Please  note that this dictation was created using voice recognition software. I have made every reasonable attempt to correct obvious errors, but I expect that there are errors of grammar and possibly content that I did not discover before finalizing the note.    Lakshmi Infante PA-C  [facility]

## 2025-08-05 ENCOUNTER — NON-PROVIDER VISIT (OUTPATIENT)
Dept: MEDICAL GROUP | Facility: IMAGING CENTER | Age: 65
End: 2025-08-05
Payer: COMMERCIAL

## 2025-08-05 DIAGNOSIS — Z23 NEED FOR VACCINATION: Primary | ICD-10-CM

## 2025-08-05 PROCEDURE — 90471 IMMUNIZATION ADMIN: CPT | Performed by: INTERNAL MEDICINE

## 2025-08-05 PROCEDURE — 90677 PCV20 VACCINE IM: CPT | Performed by: INTERNAL MEDICINE

## 2025-08-07 ENCOUNTER — APPOINTMENT (OUTPATIENT)
Dept: SLEEP MEDICINE | Facility: MEDICAL CENTER | Age: 65
End: 2025-08-07
Payer: COMMERCIAL

## 2025-08-14 ENCOUNTER — HOSPITAL ENCOUNTER (OUTPATIENT)
Dept: PULMONOLOGY | Facility: MEDICAL CENTER | Age: 65
End: 2025-08-14
Attending: STUDENT IN AN ORGANIZED HEALTH CARE EDUCATION/TRAINING PROGRAM
Payer: COMMERCIAL

## 2025-08-14 DIAGNOSIS — R06.09 EXERTIONAL DYSPNEA: ICD-10-CM

## 2025-08-14 PROCEDURE — 94729 DIFFUSING CAPACITY: CPT | Mod: 26 | Performed by: INTERNAL MEDICINE

## 2025-08-14 PROCEDURE — 94726 PLETHYSMOGRAPHY LUNG VOLUMES: CPT | Mod: 26 | Performed by: INTERNAL MEDICINE

## 2025-08-14 PROCEDURE — 94726 PLETHYSMOGRAPHY LUNG VOLUMES: CPT

## 2025-08-14 PROCEDURE — 94060 EVALUATION OF WHEEZING: CPT | Mod: 26 | Performed by: INTERNAL MEDICINE

## 2025-08-14 PROCEDURE — 94060 EVALUATION OF WHEEZING: CPT

## 2025-08-14 PROCEDURE — 94729 DIFFUSING CAPACITY: CPT

## 2025-08-14 RX ORDER — ALBUTEROL SULFATE 5 MG/ML
2.5 SOLUTION RESPIRATORY (INHALATION)
Status: DISCONTINUED | OUTPATIENT
Start: 2025-08-14 | End: 2025-08-15 | Stop reason: HOSPADM

## 2025-08-14 RX ADMIN — ALBUTEROL SULFATE 2.5 MG: 5 SOLUTION RESPIRATORY (INHALATION) at 06:55

## 2025-08-18 ENCOUNTER — RESULTS FOLLOW-UP (OUTPATIENT)
Dept: MEDICAL GROUP | Facility: IMAGING CENTER | Age: 65
End: 2025-08-18

## 2025-08-18 ENCOUNTER — OFFICE VISIT (OUTPATIENT)
Dept: SLEEP MEDICINE | Facility: MEDICAL CENTER | Age: 65
End: 2025-08-18
Payer: COMMERCIAL

## 2025-08-18 VITALS
DIASTOLIC BLOOD PRESSURE: 84 MMHG | HEART RATE: 77 BPM | SYSTOLIC BLOOD PRESSURE: 124 MMHG | HEIGHT: 72 IN | BODY MASS INDEX: 42.66 KG/M2 | RESPIRATION RATE: 16 BRPM | OXYGEN SATURATION: 92 % | WEIGHT: 315 LBS

## 2025-08-18 DIAGNOSIS — G47.33 OSA ON CPAP: Primary | ICD-10-CM

## 2025-08-18 DIAGNOSIS — E66.2 OBESITY HYPOVENTILATION SYNDROME (HCC): ICD-10-CM

## 2025-08-18 DIAGNOSIS — G47.34 NOCTURNAL HYPOXIA: ICD-10-CM

## 2025-08-18 PROCEDURE — 3074F SYST BP LT 130 MM HG: CPT

## 2025-08-18 PROCEDURE — 3079F DIAST BP 80-89 MM HG: CPT

## 2025-08-18 PROCEDURE — 99214 OFFICE O/P EST MOD 30 MIN: CPT

## 2025-08-18 PROCEDURE — 99213 OFFICE O/P EST LOW 20 MIN: CPT

## 2025-08-18 ASSESSMENT — FIBROSIS 4 INDEX: FIB4 SCORE: 1.14

## 2025-08-18 ASSESSMENT — LIFESTYLE VARIABLES
AUDIT-C TOTAL SCORE: 0
HOW MANY STANDARD DRINKS CONTAINING ALCOHOL DO YOU HAVE ON A TYPICAL DAY: PATIENT DOES NOT DRINK
HOW OFTEN DO YOU HAVE SIX OR MORE DRINKS ON ONE OCCASION: NEVER
SKIP TO QUESTIONS 9-10: 1
HOW OFTEN DO YOU HAVE A DRINK CONTAINING ALCOHOL: NEVER

## 2025-08-26 ENCOUNTER — OFFICE VISIT (OUTPATIENT)
Dept: MEDICAL GROUP | Facility: IMAGING CENTER | Age: 65
End: 2025-08-26
Payer: COMMERCIAL

## 2025-08-26 ENCOUNTER — RESULTS FOLLOW-UP (OUTPATIENT)
Dept: MEDICAL GROUP | Facility: IMAGING CENTER | Age: 65
End: 2025-08-26

## 2025-08-26 ENCOUNTER — HOSPITAL ENCOUNTER (OUTPATIENT)
Dept: RADIOLOGY | Facility: MEDICAL CENTER | Age: 65
End: 2025-08-26
Attending: STUDENT IN AN ORGANIZED HEALTH CARE EDUCATION/TRAINING PROGRAM
Payer: COMMERCIAL

## 2025-08-26 ENCOUNTER — TELEPHONE (OUTPATIENT)
Dept: MEDICAL GROUP | Facility: IMAGING CENTER | Age: 65
End: 2025-08-26

## 2025-08-26 VITALS
DIASTOLIC BLOOD PRESSURE: 68 MMHG | WEIGHT: 315 LBS | HEIGHT: 72 IN | RESPIRATION RATE: 16 BRPM | BODY MASS INDEX: 42.66 KG/M2 | TEMPERATURE: 98 F | OXYGEN SATURATION: 98 % | HEART RATE: 78 BPM | SYSTOLIC BLOOD PRESSURE: 110 MMHG

## 2025-08-26 DIAGNOSIS — M54.50 ACUTE RIGHT-SIDED LOW BACK PAIN WITHOUT SCIATICA: ICD-10-CM

## 2025-08-26 DIAGNOSIS — E66.813 CLASS 3 SEVERE OBESITY WITH SERIOUS COMORBIDITY AND BODY MASS INDEX (BMI) OF 50.0 TO 59.9 IN ADULT, UNSPECIFIED OBESITY TYPE: ICD-10-CM

## 2025-08-26 DIAGNOSIS — M54.50 ACUTE RIGHT-SIDED LOW BACK PAIN WITHOUT SCIATICA: Primary | ICD-10-CM

## 2025-08-26 DIAGNOSIS — M51.360 DEGENERATION OF INTERVERTEBRAL DISC OF LUMBAR REGION WITH DISCOGENIC BACK PAIN: ICD-10-CM

## 2025-08-26 DIAGNOSIS — M43.10 ANTEROLISTHESIS: ICD-10-CM

## 2025-08-26 DIAGNOSIS — M51.34 DDD (DEGENERATIVE DISC DISEASE), THORACIC: Primary | ICD-10-CM

## 2025-08-26 PROBLEM — R09.02 HYPOXIA: Status: RESOLVED | Noted: 2025-06-04 | Resolved: 2025-08-26

## 2025-08-26 LAB
APPEARANCE UR: CLEAR
BILIRUB UR STRIP-MCNC: NORMAL MG/DL
COLOR UR AUTO: NORMAL
GLUCOSE UR STRIP.AUTO-MCNC: NEGATIVE MG/DL
KETONES UR STRIP.AUTO-MCNC: NEGATIVE MG/DL
LEUKOCYTE ESTERASE UR QL STRIP.AUTO: NEGATIVE
NITRITE UR QL STRIP.AUTO: NEGATIVE
PH UR STRIP.AUTO: 5.5 [PH] (ref 5–8)
PROT UR QL STRIP: NEGATIVE MG/DL
RBC UR QL AUTO: NORMAL
SP GR UR STRIP.AUTO: 1.02
UROBILINOGEN UR STRIP-MCNC: 1 MG/DL

## 2025-08-26 PROCEDURE — 99214 OFFICE O/P EST MOD 30 MIN: CPT | Performed by: STUDENT IN AN ORGANIZED HEALTH CARE EDUCATION/TRAINING PROGRAM

## 2025-08-26 PROCEDURE — 3078F DIAST BP <80 MM HG: CPT | Performed by: STUDENT IN AN ORGANIZED HEALTH CARE EDUCATION/TRAINING PROGRAM

## 2025-08-26 PROCEDURE — 72070 X-RAY EXAM THORAC SPINE 2VWS: CPT

## 2025-08-26 PROCEDURE — 72110 X-RAY EXAM L-2 SPINE 4/>VWS: CPT

## 2025-08-26 PROCEDURE — 81002 URINALYSIS NONAUTO W/O SCOPE: CPT | Performed by: STUDENT IN AN ORGANIZED HEALTH CARE EDUCATION/TRAINING PROGRAM

## 2025-08-26 PROCEDURE — 3074F SYST BP LT 130 MM HG: CPT | Performed by: STUDENT IN AN ORGANIZED HEALTH CARE EDUCATION/TRAINING PROGRAM

## 2025-08-26 RX ORDER — LIDOCAINE 50 MG/G
1 PATCH TOPICAL EVERY 24 HOURS
Qty: 10 PATCH | Refills: 1 | Status: SHIPPED | OUTPATIENT
Start: 2025-08-26

## 2025-08-26 RX ORDER — MELOXICAM 7.5 MG/1
7.5 TABLET ORAL DAILY
Qty: 30 TABLET | Refills: 2 | Status: SHIPPED | OUTPATIENT
Start: 2025-08-26

## 2025-08-26 ASSESSMENT — FIBROSIS 4 INDEX: FIB4 SCORE: 1.14

## 2025-09-05 ENCOUNTER — APPOINTMENT (OUTPATIENT)
Dept: MEDICAL GROUP | Facility: IMAGING CENTER | Age: 65
End: 2025-09-05
Payer: COMMERCIAL

## 2025-10-11 ENCOUNTER — APPOINTMENT (OUTPATIENT)
Dept: SLEEP MEDICINE | Facility: MEDICAL CENTER | Age: 65
End: 2025-10-11
Payer: COMMERCIAL